# Patient Record
Sex: MALE | NOT HISPANIC OR LATINO | URBAN - METROPOLITAN AREA
[De-identification: names, ages, dates, MRNs, and addresses within clinical notes are randomized per-mention and may not be internally consistent; named-entity substitution may affect disease eponyms.]

---

## 2019-07-10 ENCOUNTER — INPATIENT (INPATIENT)
Facility: HOSPITAL | Age: 54
LOS: 1 days | Discharge: HOME | End: 2019-07-12
Attending: HOSPITALIST | Admitting: HOSPITALIST
Payer: MEDICAID

## 2019-07-10 VITALS
TEMPERATURE: 99 F | SYSTOLIC BLOOD PRESSURE: 114 MMHG | RESPIRATION RATE: 16 BRPM | DIASTOLIC BLOOD PRESSURE: 74 MMHG | HEART RATE: 89 BPM | OXYGEN SATURATION: 100 %

## 2019-07-10 DIAGNOSIS — R65.10 SYSTEMIC INFLAMMATORY RESPONSE SYNDROME (SIRS) OF NON-INFECTIOUS ORIGIN WITHOUT ACUTE ORGAN DYSFUNCTION: ICD-10-CM

## 2019-07-10 LAB
ALBUMIN SERPL ELPH-MCNC: 4.2 G/DL — SIGNIFICANT CHANGE UP (ref 3.3–5)
ALP SERPL-CCNC: 76 U/L — SIGNIFICANT CHANGE UP (ref 40–120)
ALT FLD-CCNC: 27 U/L — SIGNIFICANT CHANGE UP (ref 4–41)
ANION GAP SERPL CALC-SCNC: 9 MMO/L — SIGNIFICANT CHANGE UP (ref 7–14)
APPEARANCE UR: SIGNIFICANT CHANGE UP
APTT BLD: 27.3 SEC — LOW (ref 27.5–36.3)
AST SERPL-CCNC: 31 U/L — SIGNIFICANT CHANGE UP (ref 4–40)
BACTERIA # UR AUTO: SIGNIFICANT CHANGE UP
BASE EXCESS BLDV CALC-SCNC: 0.8 MMOL/L — SIGNIFICANT CHANGE UP
BASOPHILS # BLD AUTO: 0.06 K/UL — SIGNIFICANT CHANGE UP (ref 0–0.2)
BASOPHILS NFR BLD AUTO: 0.4 % — SIGNIFICANT CHANGE UP (ref 0–2)
BILIRUB SERPL-MCNC: 1.9 MG/DL — HIGH (ref 0.2–1.2)
BILIRUB UR-MCNC: NEGATIVE — SIGNIFICANT CHANGE UP
BLOOD GAS VENOUS - CREATININE: 0.88 MG/DL — SIGNIFICANT CHANGE UP (ref 0.5–1.3)
BLOOD GAS VENOUS - FIO2: 100 — SIGNIFICANT CHANGE UP
BLOOD UR QL VISUAL: SIGNIFICANT CHANGE UP
BUN SERPL-MCNC: 14 MG/DL — SIGNIFICANT CHANGE UP (ref 7–23)
CALCIUM SERPL-MCNC: 9.2 MG/DL — SIGNIFICANT CHANGE UP (ref 8.4–10.5)
CHLORIDE BLDV-SCNC: 110 MMOL/L — HIGH (ref 96–108)
CHLORIDE SERPL-SCNC: 104 MMOL/L — SIGNIFICANT CHANGE UP (ref 98–107)
CO2 SERPL-SCNC: 24 MMOL/L — SIGNIFICANT CHANGE UP (ref 22–31)
COLOR SPEC: YELLOW — SIGNIFICANT CHANGE UP
CREAT SERPL-MCNC: 1.07 MG/DL — SIGNIFICANT CHANGE UP (ref 0.5–1.3)
EOSINOPHIL # BLD AUTO: 0.07 K/UL — SIGNIFICANT CHANGE UP (ref 0–0.5)
EOSINOPHIL NFR BLD AUTO: 0.5 % — SIGNIFICANT CHANGE UP (ref 0–6)
GAS PNL BLDV: 133 MMOL/L — LOW (ref 136–146)
GLUCOSE BLDV-MCNC: 136 MG/DL — HIGH (ref 70–99)
GLUCOSE SERPL-MCNC: 161 MG/DL — HIGH (ref 70–99)
GLUCOSE UR-MCNC: NEGATIVE — SIGNIFICANT CHANGE UP
HCO3 BLDV-SCNC: 24 MMOL/L — SIGNIFICANT CHANGE UP (ref 20–27)
HCT VFR BLD CALC: 43.9 % — SIGNIFICANT CHANGE UP (ref 39–50)
HCT VFR BLDV CALC: 42.8 % — SIGNIFICANT CHANGE UP (ref 39–51)
HGB BLD-MCNC: 14.3 G/DL — SIGNIFICANT CHANGE UP (ref 13–17)
HGB BLDV-MCNC: 13.9 G/DL — SIGNIFICANT CHANGE UP (ref 13–17)
HYALINE CASTS # UR AUTO: NEGATIVE — SIGNIFICANT CHANGE UP
IMM GRANULOCYTES NFR BLD AUTO: 0.6 % — SIGNIFICANT CHANGE UP (ref 0–1.5)
INR BLD: 1.2 — HIGH (ref 0.88–1.17)
KETONES UR-MCNC: NEGATIVE — SIGNIFICANT CHANGE UP
LACTATE BLDV-MCNC: 1.8 MMOL/L — SIGNIFICANT CHANGE UP (ref 0.5–2)
LEUKOCYTE ESTERASE UR-ACNC: NEGATIVE — SIGNIFICANT CHANGE UP
LYMPHOCYTES # BLD AUTO: 0.85 K/UL — LOW (ref 1–3.3)
LYMPHOCYTES # BLD AUTO: 5.6 % — LOW (ref 13–44)
MCHC RBC-ENTMCNC: 26.8 PG — LOW (ref 27–34)
MCHC RBC-ENTMCNC: 32.6 % — SIGNIFICANT CHANGE UP (ref 32–36)
MCV RBC AUTO: 82.4 FL — SIGNIFICANT CHANGE UP (ref 80–100)
MONOCYTES # BLD AUTO: 0.87 K/UL — SIGNIFICANT CHANGE UP (ref 0–0.9)
MONOCYTES NFR BLD AUTO: 5.7 % — SIGNIFICANT CHANGE UP (ref 2–14)
NEUTROPHILS # BLD AUTO: 13.22 K/UL — HIGH (ref 1.8–7.4)
NEUTROPHILS NFR BLD AUTO: 87.2 % — HIGH (ref 43–77)
NITRITE UR-MCNC: NEGATIVE — SIGNIFICANT CHANGE UP
NRBC # FLD: 0 K/UL — SIGNIFICANT CHANGE UP (ref 0–0)
PCO2 BLDV: 39 MMHG — LOW (ref 41–51)
PH BLDV: 7.42 PH — SIGNIFICANT CHANGE UP (ref 7.32–7.43)
PH UR: 6.5 — SIGNIFICANT CHANGE UP (ref 5–8)
PLATELET # BLD AUTO: 226 K/UL — SIGNIFICANT CHANGE UP (ref 150–400)
PMV BLD: 9.4 FL — SIGNIFICANT CHANGE UP (ref 7–13)
PO2 BLDV: 22 MMHG — LOW (ref 35–40)
POTASSIUM BLDV-SCNC: 3.8 MMOL/L — SIGNIFICANT CHANGE UP (ref 3.4–4.5)
POTASSIUM SERPL-MCNC: 4.5 MMOL/L — SIGNIFICANT CHANGE UP (ref 3.5–5.3)
POTASSIUM SERPL-SCNC: 4.5 MMOL/L — SIGNIFICANT CHANGE UP (ref 3.5–5.3)
PROT SERPL-MCNC: 8.2 G/DL — SIGNIFICANT CHANGE UP (ref 6–8.3)
PROT UR-MCNC: 30 — SIGNIFICANT CHANGE UP
PROTHROM AB SERPL-ACNC: 13.7 SEC — HIGH (ref 9.8–13.1)
RBC # BLD: 5.33 M/UL — SIGNIFICANT CHANGE UP (ref 4.2–5.8)
RBC # FLD: 12.9 % — SIGNIFICANT CHANGE UP (ref 10.3–14.5)
RBC CASTS # UR COMP ASSIST: SIGNIFICANT CHANGE UP (ref 0–?)
SAO2 % BLDV: 35.1 % — LOW (ref 60–85)
SODIUM SERPL-SCNC: 137 MMOL/L — SIGNIFICANT CHANGE UP (ref 135–145)
SP GR SPEC: 1.02 — SIGNIFICANT CHANGE UP (ref 1–1.04)
SQUAMOUS # UR AUTO: SIGNIFICANT CHANGE UP
UROBILINOGEN FLD QL: SIGNIFICANT CHANGE UP
WBC # BLD: 15.16 K/UL — HIGH (ref 3.8–10.5)
WBC # FLD AUTO: 15.16 K/UL — HIGH (ref 3.8–10.5)
WBC UR QL: SIGNIFICANT CHANGE UP (ref 0–?)

## 2019-07-10 PROCEDURE — 71046 X-RAY EXAM CHEST 2 VIEWS: CPT | Mod: 26

## 2019-07-10 PROCEDURE — 93306 TTE W/DOPPLER COMPLETE: CPT | Mod: 26

## 2019-07-10 RX ORDER — ATORVASTATIN CALCIUM 80 MG/1
80 TABLET, FILM COATED ORAL AT BEDTIME
Refills: 0 | Status: DISCONTINUED | OUTPATIENT
Start: 2019-07-11 | End: 2019-07-11

## 2019-07-10 RX ORDER — ATORVASTATIN CALCIUM 80 MG/1
80 TABLET, FILM COATED ORAL ONCE
Refills: 0 | Status: COMPLETED | OUTPATIENT
Start: 2019-07-10 | End: 2019-07-10

## 2019-07-10 RX ORDER — METOCLOPRAMIDE HCL 10 MG
10 TABLET ORAL ONCE
Refills: 0 | Status: COMPLETED | OUTPATIENT
Start: 2019-07-10 | End: 2019-07-10

## 2019-07-10 RX ORDER — SODIUM CHLORIDE 9 MG/ML
1000 INJECTION INTRAMUSCULAR; INTRAVENOUS; SUBCUTANEOUS ONCE
Refills: 0 | Status: COMPLETED | OUTPATIENT
Start: 2019-07-10 | End: 2019-07-10

## 2019-07-10 RX ORDER — LISINOPRIL 2.5 MG/1
5 TABLET ORAL ONCE
Refills: 0 | Status: COMPLETED | OUTPATIENT
Start: 2019-07-10 | End: 2019-07-10

## 2019-07-10 RX ORDER — CLOPIDOGREL BISULFATE 75 MG/1
75 TABLET, FILM COATED ORAL ONCE
Refills: 0 | Status: COMPLETED | OUTPATIENT
Start: 2019-07-10 | End: 2019-07-10

## 2019-07-10 RX ORDER — ASPIRIN/CALCIUM CARB/MAGNESIUM 324 MG
81 TABLET ORAL DAILY
Refills: 0 | Status: DISCONTINUED | OUTPATIENT
Start: 2019-07-10 | End: 2019-07-12

## 2019-07-10 RX ORDER — PIPERACILLIN AND TAZOBACTAM 4; .5 G/20ML; G/20ML
3.38 INJECTION, POWDER, LYOPHILIZED, FOR SOLUTION INTRAVENOUS ONCE
Refills: 0 | Status: COMPLETED | OUTPATIENT
Start: 2019-07-10 | End: 2019-07-10

## 2019-07-10 RX ORDER — ACETAMINOPHEN 500 MG
975 TABLET ORAL ONCE
Refills: 0 | Status: COMPLETED | OUTPATIENT
Start: 2019-07-10 | End: 2019-07-10

## 2019-07-10 RX ORDER — DIAZEPAM 5 MG
5 TABLET ORAL ONCE
Refills: 0 | Status: DISCONTINUED | OUTPATIENT
Start: 2019-07-10 | End: 2019-07-10

## 2019-07-10 RX ADMIN — Medication 5 MILLIGRAM(S): at 10:18

## 2019-07-10 RX ADMIN — LISINOPRIL 5 MILLIGRAM(S): 2.5 TABLET ORAL at 10:18

## 2019-07-10 RX ADMIN — Medication 10 MILLIGRAM(S): at 10:07

## 2019-07-10 RX ADMIN — SODIUM CHLORIDE 1000 MILLILITER(S): 9 INJECTION INTRAMUSCULAR; INTRAVENOUS; SUBCUTANEOUS at 23:00

## 2019-07-10 RX ADMIN — Medication 975 MILLIGRAM(S): at 21:18

## 2019-07-10 RX ADMIN — SODIUM CHLORIDE 1000 MILLILITER(S): 9 INJECTION INTRAMUSCULAR; INTRAVENOUS; SUBCUTANEOUS at 21:30

## 2019-07-10 RX ADMIN — SODIUM CHLORIDE 1000 MILLILITER(S): 9 INJECTION INTRAMUSCULAR; INTRAVENOUS; SUBCUTANEOUS at 13:22

## 2019-07-10 RX ADMIN — Medication 81 MILLIGRAM(S): at 10:08

## 2019-07-10 RX ADMIN — Medication 975 MILLIGRAM(S): at 17:37

## 2019-07-10 RX ADMIN — CLOPIDOGREL BISULFATE 75 MILLIGRAM(S): 75 TABLET, FILM COATED ORAL at 10:08

## 2019-07-10 RX ADMIN — PIPERACILLIN AND TAZOBACTAM 200 GRAM(S): 4; .5 INJECTION, POWDER, LYOPHILIZED, FOR SOLUTION INTRAVENOUS at 21:30

## 2019-07-10 RX ADMIN — ATORVASTATIN CALCIUM 80 MILLIGRAM(S): 80 TABLET, FILM COATED ORAL at 10:17

## 2019-07-10 RX ADMIN — SODIUM CHLORIDE 1000 MILLILITER(S): 9 INJECTION INTRAMUSCULAR; INTRAVENOUS; SUBCUTANEOUS at 10:08

## 2019-07-10 NOTE — ED ADULT TRIAGE NOTE - CHIEF COMPLAINT QUOTE
pt comes to ED for dizziness and a fever. pt was d/pedro from [facility] in [place] where he had 4 cardiac stents placed. pt states since he got discharged yesterday he has been dizzy and weak. pt denies CP or SOB. pt is on ASA and plavix. pt comes to ED for dizziness and a fever. pt was d/pedro from [facility] in [place] where he had 4 cardiac stents placed and as per paperwork he has a stemi  pt states since he got discharged yesterday he has been dizzy and weak. pt denies CP or SOB. pt is on ASA and plavix.

## 2019-07-10 NOTE — CONSULT NOTE ADULT - SUBJECTIVE AND OBJECTIVE BOX
ERIN [id number] is a 54y old  Male who presents with a chief complaint of     HPI: 55 yo M with PMH of CAD s/p stenting, HTN, HLD presents with           MEDICATIONS  (STANDING):  aspirin  chewable 81 milliGRAM(s) Oral daily    MEDICATIONS  (PRN):    PAST MEDICAL & SURGICAL HISTORY:  MI (myocardial infarction): with 4 stents placed  HTN (hypertension)  No significant past surgical history    FAMILY HISTORY:  Family history of MI (myocardial infarction) (Father)  Family history of premature CAD (Father, Mother)    Allergies    Benadryl (Unknown)    Intolerances        SHx - No smoking, No ETOH, No drug abuse      Review of Systems:  CONSTITUTIONAL:   HEENT:  No visual loss, blurred vision, double vision.  No hearing loss, sneezing, congestion, runny nose or sore throat.  SKIN:  No rash or itching.  CARDIOVASCULAR:  No chest pain, chest pressure or chest discomfort. No palpitations or edema.  RESPIRATORY:  No shortness of breath, cough or sputum.  GASTROINTESTINAL:  No anorexia, nausea, vomiting or diarrhea. No abdominal pain.  GENITOURINARY:  NO dysuria. No increased frequency. No retention. No incontinence.  NEUROLOGICAL:  See HPI  MUSCULOSKELETAL:  No muscle, back pain, joint pain or stiffness.  HEMATOLOGIC:  No anemia, bleeding or bruising.  PSYCHIATRIC:    ENDOCRINOLOGIC:  No reports of sweating, cold or heat intolerance. No polyuria or polydipsia.        Vital Signs Last 24 Hrs  T(C): 37.1 (10 Jul 2019 08:45), Max: 37.1 (10 Jul 2019 08:45)  T(F): 98.8 (10 Jul 2019 08:45), Max: 98.8 (10 Jul 2019 08:45)  HR: 89 (10 Jul 2019 08:45) (89 - 89)  BP: 114/74 (10 Jul 2019 08:45) (114/74 - 114/74)  BP(mean): --  RR: 16 (10 Jul 2019 08:45) (16 - 16)  SpO2: 100% (10 Jul 2019 08:45) (100% - 100%)    General Exam:   General appearance: No acute distress    Cardiac:  Pulm:                 Neurological Exam:  Mental Status: Orientated to self, date and place.  Attention intact.  No dysarthria. Speech fluent.  Cranial Nerves:   PERRL, EOMI, VFF, no nystagmus.    CN V1-3 intact to light touch .  No facial asymmetry.  Hearing intact to finger rub bilaterally.  Tongue, uvula and palate midline.  Sternocleidomastoid and Trapezius intact bilaterally.    Motor:   Tone: normal.                  Strength:     [] Upper extremity                      Delt       Bicep    Tricep                                                  R         5/5 5/5 5/5 5/5                                               L          5/5 5/5 5/5       5/5  [] Lower extremity                       HF          KE          KF        DF         PF                                               R        5/5 5/5 5/5 5/5 5/5                                               L         5/5 5/5 5/5 5/5        5/5  Pronator drift: none                 Dysmetria: None to finger-nose-finger or heel-shin-heel  No truncal ataxia.    Tremor: No resting, postural or action tremor.  No myoclonus.    Sensation: intact to light touch, pinprick, vibration and proprioception    Deep Tendon Reflexes:     Biceps          Triceps      BR        Patellar        Ankle         Babinski                                         R       2+                   2+           2+            2+               2+           downgoing                                         L        2+                  2+           2+            2+               2+           downgoing    Gait: normal.      Other:    07-10    137  |  104  |  14  ----------------------------<  161<H>  4.5   |  24  |  1.07    Ca    9.2      10 Jul 2019 10:04    TPro  8.2  /  Alb  4.2  /  TBili  1.9<H>  /  DBili  x   /  AST  31  /  ALT  27  /  AlkPhos  76  07-10                            14.3   15.16 )-----------( 226      ( 10 Jul 2019 10:04 )             43.9       Radiology    CT:   MRI  EKG:  tele:  TTE:  EEG: ERIN [id number] is a 54y old  Male who presents with a chief complaint of     HPI: 53 yo M with PMH of CAD s/p stenting, HTN, HLD presents with an episode of vertigo. He reports that he when sitting down to go to the bathroom, he developed an episode of room spinning sensation. He also endorsed feeling diaphoretic. Episode of vertigo lasted 30 minutes. Endorses nausea, no vomiting. Also felt off balance.     MEDICATIONS  (STANDING):  aspirin  chewable 81 milliGRAM(s) Oral daily    MEDICATIONS  (PRN):    PAST MEDICAL & SURGICAL HISTORY:  MI (myocardial infarction): with 4 stents placed  HTN (hypertension)  No significant past surgical history    FAMILY HISTORY:  Family history of MI (myocardial infarction) (Father)  Family history of premature CAD (Father, Mother)    Allergies    Benadryl (Unknown)    Intolerances        SHx - No smoking, No ETOH, No drug abuse      Review of Systems:    see hpi      Vital Signs Last 24 Hrs  T(C): 37.1 (10 Jul 2019 08:45), Max: 37.1 (10 Jul 2019 08:45)  T(F): 98.8 (10 Jul 2019 08:45), Max: 98.8 (10 Jul 2019 08:45)  HR: 89 (10 Jul 2019 08:45) (89 - 89)  BP: 114/74 (10 Jul 2019 08:45) (114/74 - 114/74)  BP(mean): --  RR: 16 (10 Jul 2019 08:45) (16 - 16)  SpO2: 100% (10 Jul 2019 08:45) (100% - 100%)    Neurological Exam:    Mental Status: Orientated to self, date and place.  Attention intact.  No dysarthria. Speech fluent. follows all commands  Cranial Nerves: EOMI, VFF, no nystagmus.  No facial asymmetry. Tongue midline.        Strength:     [] Upper extremity                      Delt       Bicep    Tricep                                                  R         5/5        5/5        5/5       5/5                                               L          5/5        5/5        5/5       5/5    [] Lower extremity                       HF          KE          KF        DF         PF                                               R        5/5        5/5        5/5       5/5       5/5                                               L         5/5        5/5       5/5       5/5        5/5  Pronator drift: none                 Dysmetria: None to finger-nose-finger or heel-shin-heel    Sensation: intact to light touch    Gait: normal.     Other:    07-10    137  |  104  |  14  ----------------------------<  161<H>  4.5   |  24  |  1.07    Ca    9.2      10 Jul 2019 10:04    TPro  8.2  /  Alb  4.2  /  TBili  1.9<H>  /  DBili  x   /  AST  31  /  ALT  27  /  AlkPhos  76  07-10                            14.3   15.16 )-----------( 226      ( 10 Jul 2019 10:04 )             43.9       Radiology    CT:   MRI  EKG:  tele:  TTE:  EEG: ERIN [id number] is a 54y old  Male who presents with a chief complaint of     HPI: 55 yo  M with PMH of CAD s/p stenting, HTN, HLD presents with an episode of vertigo. He reports that he when sitting down to go to the bathroom at 3-4 am on 7/10, he developed an episode of room spinning sensation. Symptoms presented while he was sitting down, before he got to standing position. He also endorsed feeling diaphoretic and lightheaded along with vertiginous sensation. Episode of vertigo lasted between 30 minutes and 1 hour. Endorses nausea, no vomiting. Also felt off balance. No dysarthria, diplopia, numbness, weakness.    This past Saturday, patient was complaining of chest pain, had went to the hospital and was diagnosed with STEMI and underwent coronary artery stenting. He was recently placed on aspirin, plavix, and atorvastatin 80.    NIHSS 0. MRS 0.    MEDICATIONS  (STANDING):  aspirin  chewable 81 milliGRAM(s) Oral daily    MEDICATIONS  (PRN):    PAST MEDICAL & SURGICAL HISTORY:  MI (myocardial infarction): with 4 stents placed  HTN (hypertension)  No significant past surgical history    FAMILY HISTORY:  Family history of MI (myocardial infarction) (Father)  Family history of premature CAD (Father, Mother)    Allergies    Benadryl (Unknown)    Intolerances        SHx - No smoking, No ETOH, No drug abuse      Review of Systems:    see hpi      Vital Signs Last 24 Hrs  T(C): 37.1 (10 Jul 2019 08:45), Max: 37.1 (10 Jul 2019 08:45)  T(F): 98.8 (10 Jul 2019 08:45), Max: 98.8 (10 Jul 2019 08:45)  HR: 89 (10 Jul 2019 08:45) (89 - 89)  BP: 114/74 (10 Jul 2019 08:45) (114/74 - 114/74)  BP(mean): --  RR: 16 (10 Jul 2019 08:45) (16 - 16)  SpO2: 100% (10 Jul 2019 08:45) (100% - 100%)    Neurological Exam:    Mental Status: Orientated to self, date and place.  Attention intact.  No dysarthria. Speech fluent. follows all commands  Cranial Nerves: EOMI, VFF, no nystagmus.  No facial asymmetry. Tongue midline.        Strength:     [] Upper extremity                      Delt       Bicep    Tricep                                                  R         5/5 5/5        5/5       5/5                                               L          5/5        5/5        5/5       5/5    [] Lower extremity                       HF          KE          KF        DF         PF                                               R        5/5 5/5 5/5 5/5       5/5                                               L         5/5 5/5 5/5 5/5 5/5  Pronator drift: none                 Dysmetria: None to finger-nose-finger or heel-shin-heel    Sensation: intact to light touch    Gait: normal.     Other:    07-10    137  |  104  |  14  ----------------------------<  161<H>  4.5   |  24  |  1.07    Ca    9.2      10 Jul 2019 10:04    TPro  8.2  /  Alb  4.2  /  TBili  1.9<H>  /  DBili  x   /  AST  31  /  ALT  27  /  AlkPhos  76  07-10                            14.3   15.16 )-----------( 226      ( 10 Jul 2019 10:04 )             43.9       Radiology    CT:   MRI  EKG:  tele:  TTE:  EEG:

## 2019-07-10 NOTE — ED ADULT NURSE REASSESSMENT NOTE - NS ED NURSE REASSESS COMMENT FT1
Patient rc'd from day RN: Patient awake, alert, denies pain or any complaints at this time. Antibiotics& fluids initiated, will continue to monitor

## 2019-07-10 NOTE — ED PROVIDER NOTE - OBJECTIVE STATEMENT
09:06 [place] att: 54M days s/p 4 cardiac stents p/w fever and dizziness x   Yesterday patient was discharged from [facility] where he had 4 cardiac stents.     pt comes to ED for dizziness and a fever. pt was d/pedro from [facility] in [place] where he had 4 cardiac stents placed and as per paperwork he has a stemi  pt states since he got discharged yesterday he has been dizzy and weak. pt denies CP or SOB. pt is on ASA and plavix. 09:06 [place] att: 54M days s/p 4 cardiac stents p/w fever and dizziness x few hours     Developed chest pain on Saturday while at work.  pt was taken to [facility] where he had 2 stents placed on saturday and 2 stents placed on monday.  pt was discharged on Tuesday late afternoon.  Around midnight developed subjective fevers, at 3am while attempting to have bowel movement developed dizziness headache and sensation of room spinning with associated nausea.  denies sob, cp, back/arm/jaw pain, vomiting, diarrhea, cough, change in vision, blurry vision, numbness and or tingling  Dr Arce [facility]  09:06 [place] att: 54M days s/p 4 cardiac stents p/w fever and dizziness x few hours. Four days ago at work patient experienced severe chest pain. Taken to [facility] in [place], dx stemi, had 2 stents placed. Hospital day #2 patient was given additional 2 stents. Yesterday patient discharged. This morning at midnight patient felt warm on and off and took Tylenol. At 3-4A patient went to bathroom and felt sudden onset room spinning and nausea. Cannot stand 2/2 fear he will fall. Denies visual disturbance, aphasia, dysphagia, or focal motor or sensory disturbance of face arm or leg.     Developed chest pain on Saturday while at work.  pt was taken to [facility] where he had 2 stents placed on saturday and 2 stents placed on monday.  pt was discharged on Tuesday late afternoon.  Around midnight developed subjective fevers, at 3am while attempting to have bowel movement developed dizziness headache and sensation of room spinning with associated nausea.  denies sob, cp, back/arm/jaw pain, vomiting, diarrhea, cough, change in vision, blurry vision, numbness and or tingling  Dr Arce [facility]

## 2019-07-10 NOTE — ED PROVIDER NOTE - PHYSICAL EXAMINATION
[place] att: nad, aaox3, ctabl, rrr, s1s2, abd soft ntnd, neg le edema. CN 2-12 intact, neg pronator drift, 5/5 str throughout, sensation intact throughout, gait steady, clear speech. Pt refusing to look left. Nl finger nose. Nl rapid alt movement. Nl heel shin. Nl gait. Neg romberg.

## 2019-07-10 NOTE — ED ADULT NURSE NOTE - CHIEF COMPLAINT QUOTE
pt comes to ED for dizziness and a fever. pt was d/pedro from [facility] in [place] where he had 4 cardiac stents placed and as per paperwork he has a stemi  pt states since he got discharged yesterday he has been dizzy and weak. pt denies CP or SOB. pt is on ASA and plavix.

## 2019-07-10 NOTE — ED PROVIDER NOTE - PROGRESS NOTE DETAILS
pa taylor - symptoms improved with medication. layo vazquez- labs reviewed wbc 15 will add on ua and uc [place] PGY-2:  Signout from [place]: This AM had episode of vertigo, to get cxr/UA/echo and reassess, ok to go home if negative priscilla: asked top assess for possible cdu transfer. Had 2 coronary stents placed 5 and 3 days ago. last nite felt feverish and had 2 episodes of vertigo. Came to ED today.   exam: mild unsteadiness heel to toe. pt being evaluated by neurology. Oral temp is 101 and wbc 15k. Pt not a candidate for CDU and EM team will admit to medicine for further care.

## 2019-07-10 NOTE — ED PROVIDER NOTE - ATTENDING CONTRIBUTION TO CARE
Dr. Bartholomew: I performed a face to face bedside interview with patient regarding history of present illness, review of symptoms and past medical history. I completed an independent physical exam.  I have discussed patient's plan of care with PA.   I agree with note as stated above, having amended the EMR as needed to reflect my findings.   This includes HISTORY OF PRESENT ILLNESS, HIV, PAST MEDICAL/SURGICAL/FAMILY/SOCIAL HISTORY, ALLERGIES AND HOME MEDICATIONS, REVIEW OF SYSTEMS, PHYSICAL EXAM, and any PROGRESS NOTES during the time I functioned as the attending physician for this patient. HPI above as by me. PE above as by me. DDX low ef versus peripheral vertigo PLAN infection workup, meclizine or valium, arrange echo.

## 2019-07-10 NOTE — ED PROVIDER NOTE - CARE PLAN
Principal Discharge DX:	Dizziness Principal Discharge DX:	SIRS (systemic inflammatory response syndrome)

## 2019-07-10 NOTE — ED ADULT NURSE NOTE - OBJECTIVE STATEMENT
Pt s/p  cardiac stents x 4  on Friday c/o dizziness, headache and nausea.  Denies chest pain, sob.  Resp unlabored.  Skin intact.  IV placed. Labs sent Meds given as per order.  Pt NSR on cardiac monitoring

## 2019-07-10 NOTE — CONSULT NOTE ADULT - ASSESSMENT
Impression: Episode of vertigo could have TIA. He does have vascular risk factors. 55 yo [place] M with PMH of CAD s/p stenting, HTN, HLD presents with an episode of vertigo that lasted 30 min to 1 hour.    Impression: Episode of vertigo could have been posterior circulation TIA. BPPV is on differential but is less likely to have lasted 30 min to 1 hour. He does has multiple vascular risk factors.     Plan:  -MRI brain w/o contrast  -MRA head w/o contrast  -MRA neck w/ contrast  -c/w aspirin and plavix  -c/w atorvastatin 80  - Agree with pharmacological DVT prophylaxis   - HbA1C and LDL  - Permissive HTN up to 220/110 mmHg for first 48-72 hours from symptom onset followed by gradual normotension  - TTE with bubble study   - continue with telemetry to screen for possible cardiac source of embolism  - Prolonged cardiac monitoring with ICM to screen for occult cardiac arrhythmia like atrial fibrillation being the cause of possible cardiac source of embolism to be considered based on results of above mentioned cardiac tests 55 yo [place] M with PMH of CAD s/p stenting, HTN, HLD presents with an episode of vertigo that lasted 30 min to 1 hour. Neurological examination is normal.    Impression: Episode of vertigo could have been posterior circulation TIA. BPPV is on differential but is less likely to have lasted 30 min to 1 hour. He does has multiple vascular risk factors.     Plan:  -MRI brain w/o contrast  -MRA head w/o contrast  -MRA neck w/ contrast  -c/w aspirin and plavix  -c/w atorvastatin 80  - Agree with pharmacological DVT prophylaxis   - HbA1C and LDL  - Permissive HTN up to 220/110 mmHg for first 48-72 hours from symptom onset followed by gradual normotension  - TTE with bubble study   - continue with telemetry to screen for possible cardiac source of embolism  - Prolonged cardiac monitoring with ICM to screen for occult cardiac arrhythmia like atrial fibrillation being the cause of possible cardiac source of embolism to be considered based on results of above mentioned cardiac tests

## 2019-07-10 NOTE — ED PROVIDER NOTE - CLINICAL SUMMARY MEDICAL DECISION MAKING FREE TEXT BOX
54 year old male with 4 recents stents placed presenting with headache, weakness, nausea and dizziness r/o infection, vertigo  -labs  -valium for vertigo - pt has rash allergy to benadryl  -reglan for nausea and headache  -fluid for hydration  -aspirin and plavix - pt home meds did not take today  -echo r/o cardiac source

## 2019-07-11 DIAGNOSIS — Z95.5 PRESENCE OF CORONARY ANGIOPLASTY IMPLANT AND GRAFT: Chronic | ICD-10-CM

## 2019-07-11 DIAGNOSIS — I50.20 UNSPECIFIED SYSTOLIC (CONGESTIVE) HEART FAILURE: ICD-10-CM

## 2019-07-11 DIAGNOSIS — I50.41 ACUTE COMBINED SYSTOLIC (CONGESTIVE) AND DIASTOLIC (CONGESTIVE) HEART FAILURE: ICD-10-CM

## 2019-07-11 DIAGNOSIS — R42 DIZZINESS AND GIDDINESS: ICD-10-CM

## 2019-07-11 DIAGNOSIS — I50.30 UNSPECIFIED DIASTOLIC (CONGESTIVE) HEART FAILURE: ICD-10-CM

## 2019-07-11 DIAGNOSIS — Z29.9 ENCOUNTER FOR PROPHYLACTIC MEASURES, UNSPECIFIED: ICD-10-CM

## 2019-07-11 DIAGNOSIS — I25.10 ATHEROSCLEROTIC HEART DISEASE OF NATIVE CORONARY ARTERY WITHOUT ANGINA PECTORIS: ICD-10-CM

## 2019-07-11 DIAGNOSIS — E80.6 OTHER DISORDERS OF BILIRUBIN METABOLISM: ICD-10-CM

## 2019-07-11 DIAGNOSIS — R65.10 SYSTEMIC INFLAMMATORY RESPONSE SYNDROME (SIRS) OF NON-INFECTIOUS ORIGIN WITHOUT ACUTE ORGAN DYSFUNCTION: ICD-10-CM

## 2019-07-11 DIAGNOSIS — R19.7 DIARRHEA, UNSPECIFIED: ICD-10-CM

## 2019-07-11 LAB
ALBUMIN SERPL ELPH-MCNC: 3.7 G/DL — SIGNIFICANT CHANGE UP (ref 3.3–5)
ALP SERPL-CCNC: 66 U/L — SIGNIFICANT CHANGE UP (ref 40–120)
ALT FLD-CCNC: 27 U/L — SIGNIFICANT CHANGE UP (ref 4–41)
ANION GAP SERPL CALC-SCNC: 11 MMO/L — SIGNIFICANT CHANGE UP (ref 7–14)
AST SERPL-CCNC: 30 U/L — SIGNIFICANT CHANGE UP (ref 4–40)
B PERT DNA SPEC QL NAA+PROBE: NOT DETECTED — SIGNIFICANT CHANGE UP
BASOPHILS # BLD AUTO: 0.06 K/UL — SIGNIFICANT CHANGE UP (ref 0–0.2)
BASOPHILS NFR BLD AUTO: 0.6 % — SIGNIFICANT CHANGE UP (ref 0–2)
BILIRUB DIRECT SERPL-MCNC: 0.3 MG/DL — HIGH (ref 0.1–0.2)
BILIRUB SERPL-MCNC: 1.4 MG/DL — HIGH (ref 0.2–1.2)
BILIRUB SERPL-MCNC: 1.4 MG/DL — HIGH (ref 0.2–1.2)
BUN SERPL-MCNC: 8 MG/DL — SIGNIFICANT CHANGE UP (ref 7–23)
C PNEUM DNA SPEC QL NAA+PROBE: NOT DETECTED — SIGNIFICANT CHANGE UP
CALCIUM SERPL-MCNC: 8.4 MG/DL — SIGNIFICANT CHANGE UP (ref 8.4–10.5)
CHLORIDE SERPL-SCNC: 107 MMOL/L — SIGNIFICANT CHANGE UP (ref 98–107)
CO2 SERPL-SCNC: 19 MMOL/L — LOW (ref 22–31)
CREAT SERPL-MCNC: 0.79 MG/DL — SIGNIFICANT CHANGE UP (ref 0.5–1.3)
EOSINOPHIL # BLD AUTO: 0.01 K/UL — SIGNIFICANT CHANGE UP (ref 0–0.5)
EOSINOPHIL NFR BLD AUTO: 0.1 % — SIGNIFICANT CHANGE UP (ref 0–6)
FLUAV H1 2009 PAND RNA SPEC QL NAA+PROBE: NOT DETECTED — SIGNIFICANT CHANGE UP
FLUAV H1 RNA SPEC QL NAA+PROBE: NOT DETECTED — SIGNIFICANT CHANGE UP
FLUAV H3 RNA SPEC QL NAA+PROBE: NOT DETECTED — SIGNIFICANT CHANGE UP
FLUAV SUBTYP SPEC NAA+PROBE: NOT DETECTED — SIGNIFICANT CHANGE UP
FLUBV RNA SPEC QL NAA+PROBE: NOT DETECTED — SIGNIFICANT CHANGE UP
GLUCOSE SERPL-MCNC: 138 MG/DL — HIGH (ref 70–99)
HADV DNA SPEC QL NAA+PROBE: NOT DETECTED — SIGNIFICANT CHANGE UP
HBA1C BLD-MCNC: 6.5 % — HIGH (ref 4–5.6)
HCOV PNL SPEC NAA+PROBE: SIGNIFICANT CHANGE UP
HCT VFR BLD CALC: 40.7 % — SIGNIFICANT CHANGE UP (ref 39–50)
HGB BLD-MCNC: 13.2 G/DL — SIGNIFICANT CHANGE UP (ref 13–17)
HMPV RNA SPEC QL NAA+PROBE: NOT DETECTED — SIGNIFICANT CHANGE UP
HPIV1 RNA SPEC QL NAA+PROBE: NOT DETECTED — SIGNIFICANT CHANGE UP
HPIV2 RNA SPEC QL NAA+PROBE: NOT DETECTED — SIGNIFICANT CHANGE UP
HPIV3 RNA SPEC QL NAA+PROBE: NOT DETECTED — SIGNIFICANT CHANGE UP
HPIV4 RNA SPEC QL NAA+PROBE: NOT DETECTED — SIGNIFICANT CHANGE UP
IMM GRANULOCYTES NFR BLD AUTO: 0.4 % — SIGNIFICANT CHANGE UP (ref 0–1.5)
LYMPHOCYTES # BLD AUTO: 1.61 K/UL — SIGNIFICANT CHANGE UP (ref 1–3.3)
LYMPHOCYTES # BLD AUTO: 15.7 % — SIGNIFICANT CHANGE UP (ref 13–44)
MAGNESIUM SERPL-MCNC: 1.8 MG/DL — SIGNIFICANT CHANGE UP (ref 1.6–2.6)
MCHC RBC-ENTMCNC: 27.3 PG — SIGNIFICANT CHANGE UP (ref 27–34)
MCHC RBC-ENTMCNC: 32.4 % — SIGNIFICANT CHANGE UP (ref 32–36)
MCV RBC AUTO: 84.1 FL — SIGNIFICANT CHANGE UP (ref 80–100)
MONOCYTES # BLD AUTO: 0.85 K/UL — SIGNIFICANT CHANGE UP (ref 0–0.9)
MONOCYTES NFR BLD AUTO: 8.3 % — SIGNIFICANT CHANGE UP (ref 2–14)
NEUTROPHILS # BLD AUTO: 7.68 K/UL — HIGH (ref 1.8–7.4)
NEUTROPHILS NFR BLD AUTO: 74.9 % — SIGNIFICANT CHANGE UP (ref 43–77)
NRBC # FLD: 0 K/UL — SIGNIFICANT CHANGE UP (ref 0–0)
PHOSPHATE SERPL-MCNC: 2.3 MG/DL — LOW (ref 2.5–4.5)
PLATELET # BLD AUTO: 210 K/UL — SIGNIFICANT CHANGE UP (ref 150–400)
PMV BLD: 10.1 FL — SIGNIFICANT CHANGE UP (ref 7–13)
POTASSIUM SERPL-MCNC: 3.6 MMOL/L — SIGNIFICANT CHANGE UP (ref 3.5–5.3)
POTASSIUM SERPL-SCNC: 3.6 MMOL/L — SIGNIFICANT CHANGE UP (ref 3.5–5.3)
PROT SERPL-MCNC: 7.3 G/DL — SIGNIFICANT CHANGE UP (ref 6–8.3)
RBC # BLD: 4.84 M/UL — SIGNIFICANT CHANGE UP (ref 4.2–5.8)
RBC # FLD: 12.9 % — SIGNIFICANT CHANGE UP (ref 10.3–14.5)
RSV RNA SPEC QL NAA+PROBE: NOT DETECTED — SIGNIFICANT CHANGE UP
RV+EV RNA SPEC QL NAA+PROBE: NOT DETECTED — SIGNIFICANT CHANGE UP
SODIUM SERPL-SCNC: 137 MMOL/L — SIGNIFICANT CHANGE UP (ref 135–145)
SPECIMEN SOURCE: SIGNIFICANT CHANGE UP
SPECIMEN SOURCE: SIGNIFICANT CHANGE UP
TSH SERPL-MCNC: 1.34 UIU/ML — SIGNIFICANT CHANGE UP (ref 0.27–4.2)
WBC # BLD: 10.25 K/UL — SIGNIFICANT CHANGE UP (ref 3.8–10.5)
WBC # FLD AUTO: 10.25 K/UL — SIGNIFICANT CHANGE UP (ref 3.8–10.5)

## 2019-07-11 PROCEDURE — 99223 1ST HOSP IP/OBS HIGH 75: CPT | Mod: GC

## 2019-07-11 PROCEDURE — 70450 CT HEAD/BRAIN W/O DYE: CPT | Mod: 26

## 2019-07-11 PROCEDURE — 12345: CPT | Mod: NC

## 2019-07-11 RX ORDER — LISINOPRIL 2.5 MG/1
5 TABLET ORAL DAILY
Refills: 0 | Status: DISCONTINUED | OUTPATIENT
Start: 2019-07-11 | End: 2019-07-12

## 2019-07-11 RX ORDER — SODIUM CHLORIDE 9 MG/ML
1000 INJECTION INTRAMUSCULAR; INTRAVENOUS; SUBCUTANEOUS
Refills: 0 | Status: DISCONTINUED | OUTPATIENT
Start: 2019-07-11 | End: 2019-07-12

## 2019-07-11 RX ORDER — CLOPIDOGREL BISULFATE 75 MG/1
75 TABLET, FILM COATED ORAL DAILY
Refills: 0 | Status: DISCONTINUED | OUTPATIENT
Start: 2019-07-11 | End: 2019-07-12

## 2019-07-11 RX ORDER — METOPROLOL TARTRATE 50 MG
25 TABLET ORAL EVERY 12 HOURS
Refills: 0 | Status: DISCONTINUED | OUTPATIENT
Start: 2019-07-11 | End: 2019-07-12

## 2019-07-11 RX ORDER — PIPERACILLIN AND TAZOBACTAM 4; .5 G/20ML; G/20ML
3.38 INJECTION, POWDER, LYOPHILIZED, FOR SOLUTION INTRAVENOUS EVERY 8 HOURS
Refills: 0 | Status: DISCONTINUED | OUTPATIENT
Start: 2019-07-11 | End: 2019-07-12

## 2019-07-11 RX ORDER — ATORVASTATIN CALCIUM 80 MG/1
80 TABLET, FILM COATED ORAL AT BEDTIME
Refills: 0 | Status: DISCONTINUED | OUTPATIENT
Start: 2019-07-11 | End: 2019-07-12

## 2019-07-11 RX ORDER — ACETAMINOPHEN 500 MG
650 TABLET ORAL ONCE
Refills: 0 | Status: COMPLETED | OUTPATIENT
Start: 2019-07-11 | End: 2019-07-11

## 2019-07-11 RX ADMIN — PIPERACILLIN AND TAZOBACTAM 25 GRAM(S): 4; .5 INJECTION, POWDER, LYOPHILIZED, FOR SOLUTION INTRAVENOUS at 11:10

## 2019-07-11 RX ADMIN — Medication 650 MILLIGRAM(S): at 05:28

## 2019-07-11 RX ADMIN — Medication 81 MILLIGRAM(S): at 11:15

## 2019-07-11 RX ADMIN — CLOPIDOGREL BISULFATE 75 MILLIGRAM(S): 75 TABLET, FILM COATED ORAL at 11:15

## 2019-07-11 RX ADMIN — Medication 25 MILLIGRAM(S): at 17:44

## 2019-07-11 RX ADMIN — ATORVASTATIN CALCIUM 80 MILLIGRAM(S): 80 TABLET, FILM COATED ORAL at 00:23

## 2019-07-11 RX ADMIN — Medication 25 MILLIGRAM(S): at 05:13

## 2019-07-11 RX ADMIN — LISINOPRIL 5 MILLIGRAM(S): 2.5 TABLET ORAL at 05:13

## 2019-07-11 RX ADMIN — PIPERACILLIN AND TAZOBACTAM 25 GRAM(S): 4; .5 INJECTION, POWDER, LYOPHILIZED, FOR SOLUTION INTRAVENOUS at 18:40

## 2019-07-11 RX ADMIN — SODIUM CHLORIDE 100 MILLILITER(S): 9 INJECTION INTRAMUSCULAR; INTRAVENOUS; SUBCUTANEOUS at 12:34

## 2019-07-11 RX ADMIN — ATORVASTATIN CALCIUM 80 MILLIGRAM(S): 80 TABLET, FILM COATED ORAL at 21:54

## 2019-07-11 NOTE — H&P ADULT - PROBLEM SELECTOR PLAN 5
DVT ppx: SCDs  Regular diet Unclear source, but could be in setting of low perfusion.  - Will continue to monitor.   - Direct and indirect bili ordered for AM. Unclear source, but could be in the setting of low perfusion.  - Will continue to monitor.   - Direct and indirect bili ordered for AM.

## 2019-07-11 NOTE — PROGRESS NOTE ADULT - PROBLEM SELECTOR PLAN 3
Symptoms are c/f BPPV. Neurology consulted, appreciate recs.  F/u orthostatic vitals.  Can resume outpatient work-up for BPPV on d/c.  F/U electrolytes Symptoms are c/f BPPV. Neurology consulted, appreciate recs.  mildly positive orthostatics. c/w NS @100ml/hr x 1.5 liters; repeat orthostatics in a.m.  Can resume outpatient work-up for BPPV on d/c.  F/U electrolytes

## 2019-07-11 NOTE — PROGRESS NOTE ADULT - PROBLEM SELECTOR PLAN 4
Mildly hypokinetic LV, but overall preserved LV function and stage I diastolic CHF. Not currently in decompensated CHF. No SOB or LE edema.  No issues presently  - Continue lopressor 25 Q12H  - F/U TSH level. uclear etiology  check stool C&S, O&P, C.diff

## 2019-07-11 NOTE — H&P ADULT - NSHPREVIEWOFSYSTEMS_GEN_ALL_CORE
CONSTITUTIONAL: No weakness, fevers or chills  EYES/ENT: No visual changes;  +vertigo and dizziness, no throat pain   NECK: No pain or stiffness  RESPIRATORY: No cough, wheezing, hemoptysis; No shortness of breath  CARDIOVASCULAR: No chest pain or palpitations  GASTROINTESTINAL: No abdominal or epigastric pain. No nausea, vomiting, or hematemesis; No diarrhea or constipation. No melena or hematochezia.  GENITOURINARY: No dysuria, frequency or hematuria  NEUROLOGICAL: No numbness or weakness  SKIN: No itching, rashes

## 2019-07-11 NOTE — H&P ADULT - ASSESSMENT
54M with history of MI s/p 4 stents placed earlier this week, presenting with dizziness and subjective fevers at home, admitted for SIRS and r/o ACS.

## 2019-07-11 NOTE — H&P ADULT - HISTORY OF PRESENT ILLNESS
54M days s/p 4 cardiac stents p/w fever and dizziness x few hours. Four days ago at work patient experienced severe chest pain. Taken to [facility] in [place], dx stemi, had 2 stents placed. Hospital day #2 patient was given additional 2 stents. Yesterday patient discharged. This morning at midnight patient felt warm on and off and took Tylenol. At 3-4A patient went to bathroom and felt sudden onset room spinning and nausea. Cannot stand 2/2 fear he will fall. Denies visual disturbance, aphasia, dysphagia, or focal motor or sensory disturbance of face arm or leg.     	Developed chest pain on Saturday while at work.  pt was taken to [facility] where he had 2 stents placed on saturday and 2 stents placed on monday.  pt was discharged on Tuesday late afternoon.  Around midnight developed subjective fevers, at 3am while attempting to have bowel movement developed dizziness headache and sensation of room spinning with associated nausea.  denies sob, cp, back/arm/jaw pain, vomiting, diarrhea, cough, change in vision, blurry vision, numbness and or tingling 54M with 4 cardiac stents placed this week, presents to ED with fever (to 101F) and dizziness since today. Patient had CP at work on Saturday and was taken to [facility], where he was found to have STEMI. He had 2 stents placed on Saturday and 2 additional stents on Monday. Patient was discharged on Tuesday. Around midnight yesterday, he developed subjective fever. At 3am while attempting to have bowel movement developed dizziness headache and sensation of room spinning with associated nausea. Denies any focal neuro deficits.    In ED, patient was febrile to 101, HR was in 90s, leukocytosis to 15. Patient was started on zosyn x1 and admitted for SIRS. EKG shows NSR. 54M with 4 cardiac stents placed this week, presents to ED with fever (to 101F) and dizziness since today. Patient had CP at work on Saturday and was taken to [facility], where he was found to have STEMI. He had 2 stents placed on Saturday and 2 additional stents on Monday. Patient was discharged on Tuesday. Around midnight yesterday, he developed subjective fever. At 3am while attempting to have bowel movement, patient developed dizziness, headache and sensation of room spinning with associated nausea. Denies any focal neuro deficits.    In ED, patient was febrile to 101, HR was in 90s, leukocytosis to 15. Patient was started on zosyn x1 and admitted for SIRS. EKG shows NSR.

## 2019-07-11 NOTE — H&P ADULT - NSHPLABSRESULTS_GEN_ALL_CORE
CBC Full  -  ( 10 Jul 2019 10:04 )  WBC Count : 15.16 K/uL  RBC Count : 5.33 M/uL  Hemoglobin : 14.3 g/dL  Hematocrit : 43.9 %  Platelet Count - Automated : 226 K/uL  Mean Cell Volume : 82.4 fL  Mean Cell Hemoglobin : 26.8 pg  Mean Cell Hemoglobin Concentration : 32.6 %  Auto Neutrophil # : 13.22 K/uL  Auto Lymphocyte # : 0.85 K/uL  Auto Monocyte # : 0.87 K/uL  Auto Eosinophil # : 0.07 K/uL  Auto Basophil # : 0.06 K/uL  Auto Neutrophil % : 87.2 %  Auto Lymphocyte % : 5.6 %  Auto Monocyte % : 5.7 %  Auto Eosinophil % : 0.5 %  Auto Basophil % : 0.4 %    07-10    137  |  104  |  14  ----------------------------<  161<H>  4.5   |  24  |  1.07    Ca    9.2      10 Jul 2019 10:04    TPro  8.2  /  Alb  4.2  /  TBili  1.9<H>  /  DBili  x   /  AST  31  /  ALT  27  /  AlkPhos  76  07-10 CBC Full  -  ( 10 Jul 2019 10:04 )  WBC Count : 15.16 K/uL  RBC Count : 5.33 M/uL  Hemoglobin : 14.3 g/dL  Hematocrit : 43.9 %  Platelet Count - Automated : 226 K/uL  Mean Cell Volume : 82.4 fL  Mean Cell Hemoglobin : 26.8 pg  Mean Cell Hemoglobin Concentration : 32.6 %  Auto Neutrophil # : 13.22 K/uL  Auto Lymphocyte # : 0.85 K/uL  Auto Monocyte # : 0.87 K/uL  Auto Eosinophil # : 0.07 K/uL  Auto Basophil # : 0.06 K/uL  Auto Neutrophil % : 87.2 %  Auto Lymphocyte % : 5.6 %  Auto Monocyte % : 5.7 %  Auto Eosinophil % : 0.5 %  Auto Basophil % : 0.4 %    07-10    137  |  104  |  14  ----------------------------<  161<H>  4.5   |  24  |  1.07    Ca    9.2      10 Jul 2019 10:04    TPro  8.2  /  Alb  4.2  /  TBili  1.9<H>  /  DBili  x   /  AST  31  /  ALT  27  /  AlkPhos  76  07-10    TTE on 7/10  CONCLUSIONS:  1. Normal mitral valve. Minimal mitral regurgitation.  2. Normal trileaflet aortic valve. No aortic valve  regurgitation seen.  3. Overall preserved left ventricular systolic function.  The basal inferior wall, the basal anterolateral wall, the  mid inferior wall, and the mid inferoseptum are mildly  hypokinetic.  4. Mild diastolic dysfunction (Stage I).  5. Normal right ventricular size and function.  6. Normal pericardium with no pericardial effusion.

## 2019-07-11 NOTE — H&P ADULT - PROBLEM SELECTOR PLAN 1
Patient with HR>90, leukocytosis and febrile. Treated with IV zosyn x1.   - F/u blood and urine cultures.  - Continue zosyn q 8hrs, d/t recent PCI history.  - EKG shows NSR. Per history, dizziness sounds non-cardiac.  - Cath was done from radial approach. No signs of hematoma at access site and stable hgb. Patient with HR>90, leukocytosis and febrile. Treated with IV zosyn x1, but no clear source of infection. CXR shows no consolidation, UA is clear.  - F/u blood and urine cultures.  - Continue zosyn q 8hrs, d/t recent PCI history.  - EKG shows NSR. Per history, dizziness sounds non-cardiac.  - Cath was done through radial approach. No signs of hematoma at access site and stable hgb. Patient with HR>90, leukocytosis(15.16) and febrile (max = 101F). Treated with IV zosyn x1, but no clear source of infection. CXR shows no consolidation, UA is clear.  - F/u blood and urine cultures.  - Continue zosyn q 8hrs, d/t recent PCI history.  - EKG shows NSR. Per history, dizziness sounds non-cardiac.  - Cath was done through radial approach. No signs of hematoma at access site and stable hgb.

## 2019-07-11 NOTE — PROGRESS NOTE ADULT - PROBLEM SELECTOR PLAN 6
DVT ppx: SCDs  Regular diet Unclear source, but could be in the setting of low perfusion.  - Will continue to monitor.   - T. bili trending downward.

## 2019-07-11 NOTE — H&P ADULT - NSHPPHYSICALEXAM_GEN_ALL_CORE
Vital Signs Last 24 Hrs  T(C): 37.5 (11 Jul 2019 00:22), Max: 38.3 (10 Jul 2019 17:19)  T(F): 99.5 (11 Jul 2019 00:22), Max: 101 (10 Jul 2019 17:19)  HR: 85 (11 Jul 2019 00:22) (85 - 96)  BP: 127/88 (11 Jul 2019 00:22) (114/74 - 127/88)  BP(mean): --  RR: 18 (11 Jul 2019 00:22) (16 - 18)  SpO2: 99% (11 Jul 2019 00:22) (98% - 100%)    GENERAL: NAD, well-developed  HEAD:  Atraumatic, Normocephalic  EYES: EOMI, conjunctiva and sclera clear  NECK: Supple, No JVD  CHEST/LUNG: Clear to auscultation bilaterally; No wheeze, ronchi or rales  HEART: Regular rate and rhythm; No murmurs, rubs, or gallops  ABDOMEN: Soft, Nontender, Nondistended; Bowel sounds present  EXTREMITIES:  2+ Peripheral Pulses, No clubbing, cyanosis, or edema  PSYCH: AAOx3  NEUROLOGY: non-focal  SKIN: No rashes or lesions Vital Signs Last 24 Hrs  T(C): 37.5 (11 Jul 2019 00:22), Max: 38.3 (10 Jul 2019 17:19)  T(F): 99.5 (11 Jul 2019 00:22), Max: 101 (10 Jul 2019 17:19)  HR: 85 (11 Jul 2019 00:22) (85 - 96)  BP: 127/88 (11 Jul 2019 00:22) (114/74 - 127/88)  BP(mean): --  RR: 18 (11 Jul 2019 00:22) (16 - 18)  SpO2: 99% (11 Jul 2019 00:22) (98% - 100%)    GENERAL: NAD, well-developed  HEAD:  Atraumatic, Normocephalic  EYES: EOMI, conjunctiva and sclera clear  NECK: Supple, No JVD  CHEST/LUNG: Clear to auscultation bilaterally; No wheeze, rhonchi or rales  HEART: Regular rate and rhythm; No murmurs, rubs, or gallops  ABDOMEN: Soft, Nontender, Nondistended; Bowel sounds present  EXTREMITIES:  2+ Peripheral Pulses, No clubbing, cyanosis, or edema  PSYCH: AAOx3, mood and affect appropriate to situation  NEUROLOGY: non-focal  SKIN: No rashes or lesions

## 2019-07-11 NOTE — H&P ADULT - PROBLEM SELECTOR PLAN 4
Unclear source, but could be in setting of low perfusion.  - Will continue to monitor.   - Direct and indirect bili ordered for AM. Mildly hypokinetic LV, but overall preserve LV function. Not currently in decompensated CHF. No SOB or LE edema.  - Continue lopressor 25 bid. Mildly hypokinetic LV, but overall preserved LV function and stage I diastolic CHF. Not currently in decompensated CHF. No SOB or LE edema.  - Continue lopressor 25 bid. Mildly hypokinetic LV, but overall preserved LV function and stage I diastolic CHF. Not currently in decompensated CHF. No SOB or LE edema.  No issues presently  - Continue lopressor 25 Q12H  - F/U TSH level.

## 2019-07-11 NOTE — PROGRESS NOTE ADULT - SUBJECTIVE AND OBJECTIVE BOX
Patient is a 54y old  Male who presents with a chief complaint of Dizziness, diaphoresis (2019 03:03)      SUBJECTIVE / OVERNIGHT EVENTS:    MEDICATIONS  (STANDING):  aspirin  chewable 81 milliGRAM(s) Oral daily  atorvastatin 80 milliGRAM(s) Oral at bedtime  clopidogrel Tablet 75 milliGRAM(s) Oral daily  lisinopril 5 milliGRAM(s) Oral daily  metoprolol tartrate 25 milliGRAM(s) Oral every 12 hours  piperacillin/tazobactam IVPB.. 3.375 Gram(s) IV Intermittent every 8 hours  sodium chloride 0.9%. 1000 milliLiter(s) (100 mL/Hr) IV Continuous <Continuous>    MEDICATIONS  (PRN):      Vital Signs Last 24 Hrs  T(C): 36.6 (2019 13:49), Max: 38.3 (10 Jul 2019 17:19)  T(F): 97.8 (2019 13:49), Max: 101 (10 Jul 2019 17:19)  HR: 82 (2019 13:49) (80 - 92)  BP: 127/82 (2019 07:35) (116/72 - 129/88)  BP(mean): --  RR: 18 (2019 13:49) (16 - 18)  SpO2: 100% (2019 13:49) (98% - 100%)  CAPILLARY BLOOD GLUCOSE        I&O's Summary      PHYSICAL EXAM:  GENERAL: NAD, well-developed  HEAD:  Atraumatic, Normocephalic  EYES: EOMI, PERRLA, conjunctiva and sclera clear  NECK: Supple, No JVD  CHEST/LUNG: Clear to auscultation bilaterally; No wheeze  HEART: Regular rate and rhythm; No murmurs, rubs, or gallops  ABDOMEN: Soft, Nontender, Nondistended; Bowel sounds present  EXTREMITIES:  2+ Peripheral Pulses, No clubbing, cyanosis, or edema  PSYCH: AAOx3  NEUROLOGY: non-focal  SKIN: No rashes or lesions    LABS:                        13.2   10.25 )-----------( 210      ( 2019 06:00 )             40.7     07-11    137  |  107  |  8   ----------------------------<  138<H>  3.6   |  19<L>  |  0.79    Ca    8.4      2019 06:00  Phos  2.3     07-11  Mg     1.8     07-11    TPro  7.3  /  Alb  3.7  /  TBili  1.4<H>  /  DBili  0.3<H>  /  AST  30  /  ALT  27  /  AlkPhos  66  07-11    PT/INR - ( 10 Jul 2019 10:04 )   PT: 13.7 SEC;   INR: 1.20          PTT - ( 10 Jul 2019 10:04 )  PTT:27.3 SEC      Urinalysis Basic - ( 10 Jul 2019 18:17 )    Color: YELLOW / Appearance: Lt TURBID / S.020 / pH: 6.5  Gluc: NEGATIVE / Ketone: NEGATIVE  / Bili: NEGATIVE / Urobili: TRACE   Blood: SMALL / Protein: 30 / Nitrite: NEGATIVE   Leuk Esterase: NEGATIVE / RBC: 0-2 / WBC 2-5   Sq Epi: OCC / Non Sq Epi: x / Bacteria: SMALL        RADIOLOGY & ADDITIONAL TESTS:    Imaging Personally Reviewed:    Consultant(s) Notes Reviewed:      Care Discussed with Consultants/Other Providers: Patient is a 54y old  Male who presents with a chief complaint of Dizziness, diaphoresis (2019 03:03)      SUBJECTIVE / OVERNIGHT EVENTS:  Patient c/o watery diarrhea x 4 and HA with ambulation.  Denies cp, SOB, abdominal pain, N/V/D     MEDICATIONS  (STANDING):  aspirin  chewable 81 milliGRAM(s) Oral daily  atorvastatin 80 milliGRAM(s) Oral at bedtime  clopidogrel Tablet 75 milliGRAM(s) Oral daily  lisinopril 5 milliGRAM(s) Oral daily  metoprolol tartrate 25 milliGRAM(s) Oral every 12 hours  piperacillin/tazobactam IVPB.. 3.375 Gram(s) IV Intermittent every 8 hours  sodium chloride 0.9%. 1000 milliLiter(s) (100 mL/Hr) IV Continuous <Continuous>    MEDICATIONS  (PRN):      Vital Signs Last 24 Hrs  T(C): 36.6 (2019 13:49), Max: 38.3 (10 Jul 2019 17:19)  T(F): 97.8 (2019 13:49), Max: 101 (10 Jul 2019 17:19)  HR: 82 (2019 13:49) (80 - 92)  BP: 127/82 (2019 07:35) (116/72 - 129/88)  BP(mean): --  RR: 18 (2019 13:49) (16 - 18)  SpO2: 100% (2019 13:49) (98% - 100%)  CAPILLARY BLOOD GLUCOSE        I&O's Summary      PHYSICAL EXAM:  GENERAL: NAD, well-developed  HEAD:  Atraumatic, Normocephalic  EYES: EOMI, PERRLA, conjunctiva and sclera clear  NECK: Supple, No JVD  CHEST/LUNG: Clear to auscultation bilaterally; No wheeze  HEART: Regular rate and rhythm; No murmurs, rubs, or gallops  ABDOMEN: Soft, Nontender, Nondistended; Bowel sounds present  EXTREMITIES:  2+ Peripheral Pulses, No clubbing, cyanosis, or edema  PSYCH: AAOx3  NEUROLOGY: non-focal  SKIN: No rashes or lesions    LABS:                        13.2   10.25 )-----------( 210      ( 2019 06:00 )             40.7     07-11    137  |  107  |  8   ----------------------------<  138<H>  3.6   |  19<L>  |  0.79    Ca    8.4      2019 06:00  Phos  2.3       Mg     1.8         TPro  7.3  /  Alb  3.7  /  TBili  1.4<H>  /  DBili  0.3<H>  /  AST  30  /  ALT  27  /  AlkPhos  66  07-11    PT/INR - ( 10 Jul 2019 10:04 )   PT: 13.7 SEC;   INR: 1.20          PTT - ( 10 Jul 2019 10:04 )  PTT:27.3 SEC      Urinalysis Basic - ( 10 Jul 2019 18:17 )    Color: YELLOW / Appearance: Lt TURBID / S.020 / pH: 6.5  Gluc: NEGATIVE / Ketone: NEGATIVE  / Bili: NEGATIVE / Urobili: TRACE   Blood: SMALL / Protein: 30 / Nitrite: NEGATIVE   Leuk Esterase: NEGATIVE / RBC: 0-2 / WBC 2-5   Sq Epi: OCC / Non Sq Epi: x / Bacteria: SMALL        RADIOLOGY & ADDITIONAL TESTS:    Imaging Personally Reviewed:    Consultant(s) Notes Reviewed:      Care Discussed with Consultants/Other Providers:

## 2019-07-11 NOTE — H&P ADULT - NSHPSOCIALHISTORY_GEN_ALL_CORE
Lives with wife at home. Denies alcohol, cigarette or drug use. Lives with wife at home.   Denies alcohol, cigarette or drug use.

## 2019-07-11 NOTE — PROGRESS NOTE ADULT - ASSESSMENT
54 y.o. Male  w/ hx chronic diastolic HF, CAD s/p MI s/p 4 stents placed earlier this week, p/w dizziness and subjective fevers at home, admitted for SIRS and r/o ACS.

## 2019-07-11 NOTE — H&P ADULT - NSICDXPASTSURGICALHX_GEN_ALL_CORE_FT
PAST SURGICAL HISTORY:  No significant past surgical history PAST SURGICAL HISTORY:  Stented coronary artery ~ earlier 07/2019

## 2019-07-11 NOTE — PROGRESS NOTE ADULT - PROBLEM SELECTOR PLAN 5
Unclear source, but could be in the setting of low perfusion.  - Will continue to monitor.   - Direct and indirect bili ordered for AM. Mildly hypokinetic LV, but overall preserved LV function and stage I diastolic CHF. Not currently in decompensated CHF. No SOB or LE edema.  No issues presently  - Continue lopressor 25 Q12H

## 2019-07-11 NOTE — H&P ADULT - PROBLEM SELECTOR PLAN 2
Patient with significant family history of CAD, s/p 4 stents placed this week in setting of STEMI.  Will attempt to get cath report from [facility].  - Continue ASA, plavix and atorvastatin. Patient with significant family history of CAD, s/p 4 stents placed this week in setting of STEMI.  Will attempt to get cath report from [facility].  - Continue ASA, plavix and atorvastatin.  - TTE shows EF of 55% Patient with significant family history of CAD, s/p 4 stents placed this week at [facility], in the setting of STEMI.  Will attempt to get cath report.  - Continue ASA, Plavix and atorvastatin.  - TTE shows EF of 55%, and Stage I Diastolic dysfunction

## 2019-07-11 NOTE — H&P ADULT - PROBLEM SELECTOR PLAN 3
Symptoms are c/f BPPV. Neurology consulted appreciate recs.   TTE shows EF of Symptoms are c/f BPPV. Neurology consulted, appreciate recs.  F/u orthostatic vitals. Symptoms are c/f BPPV. Neurology consulted, appreciate recs.  F/u orthostatic vitals.  Can resume outpatient work-up for BPPV on d/c. Symptoms are c/f BPPV. Neurology consulted, appreciate recs.  F/u orthostatic vitals.  Can resume outpatient work-up for BPPV on d/c.  F/U electrolytes

## 2019-07-11 NOTE — H&P ADULT - NSICDXPASTMEDICALHX_GEN_ALL_CORE_FT
PAST MEDICAL HISTORY:  HTN (hypertension)     MI (myocardial infarction) with 4 stents placed PAST MEDICAL HISTORY:  Diastolic heart failure ~ [place]    HTN (hypertension)     MI (myocardial infarction) with 4 stents placed

## 2019-07-11 NOTE — H&P ADULT - PROBLEM SELECTOR PROBLEM 4
Hyperbilirubinemia Systolic CHF Systolic and diastolic CHF, acute Diastolic heart failure, unspecified HF chronicity

## 2019-07-12 ENCOUNTER — TRANSCRIPTION ENCOUNTER (OUTPATIENT)
Age: 54
End: 2019-07-12

## 2019-07-12 VITALS
TEMPERATURE: 98 F | DIASTOLIC BLOOD PRESSURE: 81 MMHG | OXYGEN SATURATION: 98 % | HEART RATE: 74 BPM | RESPIRATION RATE: 18 BRPM | SYSTOLIC BLOOD PRESSURE: 124 MMHG

## 2019-07-12 LAB
ANION GAP SERPL CALC-SCNC: 11 MMO/L — SIGNIFICANT CHANGE UP (ref 7–14)
BACTERIA UR CULT: SIGNIFICANT CHANGE UP
BASOPHILS # BLD AUTO: 0.05 K/UL — SIGNIFICANT CHANGE UP (ref 0–0.2)
BASOPHILS NFR BLD AUTO: 0.7 % — SIGNIFICANT CHANGE UP (ref 0–2)
BUN SERPL-MCNC: 8 MG/DL — SIGNIFICANT CHANGE UP (ref 7–23)
C DIFF TOX GENS STL QL NAA+PROBE: SIGNIFICANT CHANGE UP
CALCIUM SERPL-MCNC: 8.8 MG/DL — SIGNIFICANT CHANGE UP (ref 8.4–10.5)
CHLORIDE SERPL-SCNC: 108 MMOL/L — HIGH (ref 98–107)
CO2 SERPL-SCNC: 19 MMOL/L — LOW (ref 22–31)
CREAT SERPL-MCNC: 0.76 MG/DL — SIGNIFICANT CHANGE UP (ref 0.5–1.3)
EOSINOPHIL # BLD AUTO: 0.15 K/UL — SIGNIFICANT CHANGE UP (ref 0–0.5)
EOSINOPHIL NFR BLD AUTO: 2 % — SIGNIFICANT CHANGE UP (ref 0–6)
GLUCOSE SERPL-MCNC: 135 MG/DL — HIGH (ref 70–99)
HCT VFR BLD CALC: 40.6 % — SIGNIFICANT CHANGE UP (ref 39–50)
HGB BLD-MCNC: 13.2 G/DL — SIGNIFICANT CHANGE UP (ref 13–17)
IMM GRANULOCYTES NFR BLD AUTO: 0.4 % — SIGNIFICANT CHANGE UP (ref 0–1.5)
LYMPHOCYTES # BLD AUTO: 1.7 K/UL — SIGNIFICANT CHANGE UP (ref 1–3.3)
LYMPHOCYTES # BLD AUTO: 23 % — SIGNIFICANT CHANGE UP (ref 13–44)
MAGNESIUM SERPL-MCNC: 1.9 MG/DL — SIGNIFICANT CHANGE UP (ref 1.6–2.6)
MCHC RBC-ENTMCNC: 26.6 PG — LOW (ref 27–34)
MCHC RBC-ENTMCNC: 32.5 % — SIGNIFICANT CHANGE UP (ref 32–36)
MCV RBC AUTO: 81.9 FL — SIGNIFICANT CHANGE UP (ref 80–100)
MONOCYTES # BLD AUTO: 0.81 K/UL — SIGNIFICANT CHANGE UP (ref 0–0.9)
MONOCYTES NFR BLD AUTO: 11 % — SIGNIFICANT CHANGE UP (ref 2–14)
NEUTROPHILS # BLD AUTO: 4.65 K/UL — SIGNIFICANT CHANGE UP (ref 1.8–7.4)
NEUTROPHILS NFR BLD AUTO: 62.9 % — SIGNIFICANT CHANGE UP (ref 43–77)
NRBC # FLD: 0 K/UL — SIGNIFICANT CHANGE UP (ref 0–0)
PLATELET # BLD AUTO: 225 K/UL — SIGNIFICANT CHANGE UP (ref 150–400)
PMV BLD: 10 FL — SIGNIFICANT CHANGE UP (ref 7–13)
POTASSIUM SERPL-MCNC: 3.5 MMOL/L — SIGNIFICANT CHANGE UP (ref 3.5–5.3)
POTASSIUM SERPL-SCNC: 3.5 MMOL/L — SIGNIFICANT CHANGE UP (ref 3.5–5.3)
RBC # BLD: 4.96 M/UL — SIGNIFICANT CHANGE UP (ref 4.2–5.8)
RBC # FLD: 12.8 % — SIGNIFICANT CHANGE UP (ref 10.3–14.5)
SODIUM SERPL-SCNC: 138 MMOL/L — SIGNIFICANT CHANGE UP (ref 135–145)
SPECIMEN SOURCE: SIGNIFICANT CHANGE UP
SPECIMEN SOURCE: SIGNIFICANT CHANGE UP
WBC # BLD: 7.39 K/UL — SIGNIFICANT CHANGE UP (ref 3.8–10.5)
WBC # FLD AUTO: 7.39 K/UL — SIGNIFICANT CHANGE UP (ref 3.8–10.5)

## 2019-07-12 PROCEDURE — 99239 HOSP IP/OBS DSCHRG MGMT >30: CPT

## 2019-07-12 PROCEDURE — 99221 1ST HOSP IP/OBS SF/LOW 40: CPT

## 2019-07-12 RX ADMIN — PIPERACILLIN AND TAZOBACTAM 25 GRAM(S): 4; .5 INJECTION, POWDER, LYOPHILIZED, FOR SOLUTION INTRAVENOUS at 09:59

## 2019-07-12 RX ADMIN — Medication 25 MILLIGRAM(S): at 05:42

## 2019-07-12 RX ADMIN — Medication 25 MILLIGRAM(S): at 17:37

## 2019-07-12 RX ADMIN — CLOPIDOGREL BISULFATE 75 MILLIGRAM(S): 75 TABLET, FILM COATED ORAL at 12:43

## 2019-07-12 RX ADMIN — Medication 81 MILLIGRAM(S): at 12:43

## 2019-07-12 RX ADMIN — PIPERACILLIN AND TAZOBACTAM 25 GRAM(S): 4; .5 INJECTION, POWDER, LYOPHILIZED, FOR SOLUTION INTRAVENOUS at 02:19

## 2019-07-12 RX ADMIN — LISINOPRIL 5 MILLIGRAM(S): 2.5 TABLET ORAL at 05:42

## 2019-07-12 NOTE — PROGRESS NOTE ADULT - PROBLEM SELECTOR PLAN 2
Patient with significant family history of CAD, s/p 4 stents placed this week at [facility], in the setting of STEMI.  Will attempt to get cath report.  - Continue ASA, Plavix and atorvastatin.  - TTE shows EF of 55%, and Stage I Diastolic dysfunction
Patient with significant family history of CAD, s/p 4 stents placed this week at [facility], in the setting of STEMI.  - Continue ASA, Plavix and atorvastatin.  - TTE shows EF of 55%, and Stage I Diastolic dysfunction

## 2019-07-12 NOTE — PROGRESS NOTE ADULT - PROBLEM SELECTOR PLAN 5
Mildly hypokinetic LV, but overall preserved LV function and stage I diastolic CHF. Not currently in decompensated CHF. No SOB or LE edema.  No issues presently  - Continue lopressor 25 Q12H

## 2019-07-12 NOTE — PROGRESS NOTE ADULT - SUBJECTIVE AND OBJECTIVE BOX
Patient is a 54y old  Male who presents with a chief complaint of Dizziness, diaphoresis (2019 14:38)      SUBJECTIVE / OVERNIGHT EVENTS:  Diarrhea resolved. Patient has no new complaints. Denies cp, SOB, abdominal pain, N/V/D     MEDICATIONS  (STANDING):  aspirin  chewable 81 milliGRAM(s) Oral daily  atorvastatin 80 milliGRAM(s) Oral at bedtime  clopidogrel Tablet 75 milliGRAM(s) Oral daily  lisinopril 5 milliGRAM(s) Oral daily  metoprolol tartrate 25 milliGRAM(s) Oral every 12 hours  piperacillin/tazobactam IVPB.. 3.375 Gram(s) IV Intermittent every 8 hours  sodium chloride 0.9%. 1000 milliLiter(s) (100 mL/Hr) IV Continuous <Continuous>    MEDICATIONS  (PRN):      Vital Signs Last 24 Hrs  T(C): 36.8 (2019 12:50), Max: 36.8 (2019 12:50)  T(F): 98.2 (2019 12:50), Max: 98.2 (2019 12:50)  HR: 67 (2019 12:50) (67 - 80)  BP: 119/78 (2019 12:50) (119/78 - 141/89)  BP(mean): --  RR: 18 (2019 12:50) (18 - 18)  SpO2: 98% (2019 12:50) (98% - 100%)  CAPILLARY BLOOD GLUCOSE        I&O's Summary    2019 07:01  -  2019 07:00  --------------------------------------------------------  IN: 418 mL / OUT: 850 mL / NET: -432 mL        PHYSICAL EXAM:   GENERAL: NAD, well-developed  HEAD:  Atraumatic, Normocephalic  EYES: EOMI, PERRLA, conjunctiva and sclera clear  NECK: Supple, No JVD  CHEST/LUNG: Clear to auscultation bilaterally; No wheeze  HEART: Regular rate and rhythm; No murmurs, rubs, or gallops  ABDOMEN: Soft, Nontender, Nondistended; Bowel sounds present  EXTREMITIES:  2+ Peripheral Pulses, No clubbing, cyanosis, or edema  PSYCH: AAOx3  NEUROLOGY: non-focal  SKIN: No rashes or lesions    LABS:                        13.2   7.39  )-----------( 225      ( 2019 06:22 )             40.6     07-12    138  |  108<H>  |  8   ----------------------------<  135<H>  3.5   |  19<L>  |  0.76    Ca    8.8      2019 06:22  Phos  2.3     07-11  Mg     1.9     -12    TPro  7.3  /  Alb  3.7  /  TBili  1.4<H>  /  DBili  0.3<H>  /  AST  30  /  ALT  27  /  AlkPhos  66  07-11          Urinalysis Basic - ( 10 Jul 2019 18:17 )    Color: YELLOW / Appearance: Lt TURBID / S.020 / pH: 6.5  Gluc: NEGATIVE / Ketone: NEGATIVE  / Bili: NEGATIVE / Urobili: TRACE   Blood: SMALL / Protein: 30 / Nitrite: NEGATIVE   Leuk Esterase: NEGATIVE / RBC: 0-2 / WBC 2-5   Sq Epi: OCC / Non Sq Epi: x / Bacteria: SMALL        RADIOLOGY & ADDITIONAL TESTS:    Imaging Personally Reviewed:    Consultant(s) Notes Reviewed:      Care Discussed with Consultants/Other Providers:

## 2019-07-12 NOTE — PROGRESS NOTE ADULT - ASSESSMENT
54 y.o. Male  w/ hx chronic diastolic HF, CAD s/p MI s/p 4 stents placed earlier this week, p/w dizziness and subjective fevers at home, admitted for SIRS and r/o ACS. 54 y.o. Male  w/ hx chronic diastolic HF, CAD s/p MI s/p 4 stents placed earlier this week, p/w dizziness and subjective fevers at home, admitted for SIRS. d/c 40 minutes.

## 2019-07-12 NOTE — DISCHARGE NOTE NURSING/CASE MANAGEMENT/SOCIAL WORK - NSDCPEPT PROEDHF_GEN_ALL_CORE
Low salt diet/Report signs and symptoms to primary care provider/Activities as tolerated/Call primary care provider for follow up after discharge/Monitor weight daily

## 2019-07-12 NOTE — DISCHARGE NOTE PROVIDER - CARE PROVIDER_API CALL
Primary care doctor,   Phone: (   )    -  Fax: (   )    -  Follow Up Time:     cardiology,   Phone: (   )    -  Fax: (   )    -  Follow Up Time:

## 2019-07-12 NOTE — PROGRESS NOTE ADULT - PROBLEM SELECTOR PLAN 6
Unclear source, but could be in the setting of low perfusion.  - Will continue to monitor.   - T. bili trending downward.

## 2019-07-12 NOTE — DISCHARGE NOTE NURSING/CASE MANAGEMENT/SOCIAL WORK - NSDCDPATPORTLINK_GEN_ALL_CORE
You can access the [facility] Patient Portal, offered by [facility], by registering with the following website: [email or web address]

## 2019-07-12 NOTE — DISCHARGE NOTE PROVIDER - HOSPITAL COURSE
HPI:    54M with 4 cardiac stents placed this week, presents to ED with fever (to 101F) and dizziness since today. Patient had CP at work on Saturday and was taken to [facility], where he was found to have STEMI. He had 2 stents placed on Saturday and 2 additional stents on Monday. Patient was discharged on Tuesday. Around midnight yesterday, he developed subjective fever. At 3am while attempting to have bowel movement, patient developed dizziness, headache and sensation of room spinning with associated nausea. Denies any focal neuro deficits.        In ED, patient was febrile to 101, HR was in 90s, leukocytosis to 15. Patient was started on zosyn x1 and admitted for SIRS. EKG shows NSR. (11 Jul 2019 03:03)                + SIRS (systemic inflammatory response syndrome). Patient with HR>90, leukocytosis(15.16) and febrile (max = 101F). Treated with IV zosyn x1, but no clear source of infection. CXR shows no consolidation, UA is clear.    blood and urine cultures NTD    d/c zosyn q 8hrs    - EKG shows NSR. Per history, dizziness sounds non-cardiac.    - Cath was done through radial approach. No signs of hematoma at access site and stable hgb.         + CAD S/P percutaneous coronary angioplasty. Patient with significant family history of CAD, s/p 4 stents placed this week at [facility], in the setting of STEMI.    - Continue ASA, Plavix and atorvastatin.    - TTE shows EF of 55%, and Stage I Diastolic dysfunction.         + Vertigo.  Symptoms are c/f BPPV. Neurology consulted, appreciate recs.    mildly positive orthostatics resolved with NS @100ml/hr x 1.5 liters.     Can resume outpatient work-up for BPPV on d/c.         + Diarrhea, unspecified type. unclear etiology, resolved.         + Diastolic heart failure, unspecified HF chronicity.  Plan: Mildly hypokinetic LV, but overall preserved LV function and stage I diastolic CHF. Not currently in decompensated CHF. No SOB or LE edema.    No issues presently    - Continue lopressor 25 Q12H.         + Hyperbilirubinemia. Unclear source, but could be in the setting of low perfusion. T. bili trending downward. HPI:    54M with 4 cardiac stents placed this week, presents to ED with fever (to 101F) and dizziness since today. Patient had CP at work on Saturday and was taken to [facility], where he was found to have STEMI. He had 2 stents placed on Saturday and 2 additional stents on Monday. Patient was discharged on Tuesday. Around midnight yesterday, he developed subjective fever. At 3am while attempting to have bowel movement, patient developed dizziness, headache and sensation of room spinning with associated nausea. Denies any focal neuro deficits.        In ED, patient was febrile to 101, HR was in 90s, leukocytosis to 15. Patient was started on zosyn x1 and admitted for SIRS. EKG shows NSR. (11 Jul 2019 03:03)                + SIRS (systemic inflammatory response syndrome). Patient with HR>90, leukocytosis(15.16) and febrile (max = 101F). Treated with IV zosyn x1, but no clear source of infection. CXR shows no consolidation, UA is clear.    blood and urine cultures NTD    d/c zosyn q 8hrs    - EKG shows NSR. Per history, dizziness sounds non-cardiac.    - Cath was done through radial approach. No signs of hematoma at access site and stable hgb.         + CAD S/P percutaneous coronary angioplasty. Patient with significant family history of CAD, s/p 4 stents placed this week at [facility], in the setting of STEMI.    - Continue ASA, Plavix and atorvastatin.    - TTE shows EF of 55%, and Stage I Diastolic dysfunction.         + Vertigo.  Symptoms are c/f BPPV. Neurology consulted, appreciate recs.    mildly positive orthostatics resolved with NS @100ml/hr x 1.5 liters.     Can resume outpatient work-up for BPPV on d/c.         + Diarrhea, unspecified type. unclear etiology, resolved., Cdiff negative        + Diastolic heart failure, unspecified HF chronicity.  Plan: Mildly hypokinetic LV, but overall preserved LV function and stage I diastolic CHF. Not currently in decompensated CHF. No SOB or LE edema.    No issues presently    - Continue lopressor 25 Q12H.         + Hyperbilirubinemia. Unclear source, but could be in the setting of low perfusion. T. bili trending downward.        Discussed case with Dr. Schulte and pt cleared for discharge home.

## 2019-07-12 NOTE — PROGRESS NOTE ADULT - PROBLEM SELECTOR PLAN 3
Symptoms are c/f BPPV. Neurology consulted, appreciate recs.  mildly positive orthostatics resolved with NS @100ml/hr x 1.5 liters.   Can resume outpatient work-up for BPPV on d/c.

## 2019-07-12 NOTE — PROGRESS NOTE ADULT - PROBLEM SELECTOR PLAN 1
Patient with HR>90, leukocytosis(15.16) and febrile (max = 101F). Treated with IV zosyn x1, but no clear source of infection. CXR shows no consolidation, UA is clear.  blood and urine cultures NTD  d/c zosyn q 8hrs  - EKG shows NSR. Per history, dizziness sounds non-cardiac.  - Cath was done through radial approach. No signs of hematoma at access site and stable hgb.
Patient with HR>90, leukocytosis(15.16) and febrile (max = 101F). Treated with IV zosyn x1, but no clear source of infection. CXR shows no consolidation, UA is clear.  - F/u blood and urine cultures.  - Continue zosyn q 8hrs, d/t recent PCI history.  - EKG shows NSR. Per history, dizziness sounds non-cardiac.  - Cath was done through radial approach. No signs of hematoma at access site and stable hgb.

## 2019-07-12 NOTE — DISCHARGE NOTE PROVIDER - NSDCCPCAREPLAN_GEN_ALL_CORE_FT
PRINCIPAL DISCHARGE DIAGNOSIS  Diagnosis: SIRS (systemic inflammatory response syndrome)  Assessment and Plan of Treatment: Blood cultures negative, no obvious sourse of infection. Your white blood cell count has normalized. Call your doctor if develop fever at home.      SECONDARY DISCHARGE DIAGNOSES  Diagnosis: Dizziness  Assessment and Plan of Treatment: CT head was negative. Likely due to orthostatic hypotension, improved after IV fluids. Give your body time to adjust to position changes prior to standing from laying or sitting position.    Diagnosis: Prediabetes  Assessment and Plan of Treatment: low salt, fat and carbohydrate diet, minimize glucose intake.  Exercise daily for at least 30 minutes and weight loss.  Follow up with primary care physician routine Hemoglobin A1C checks and management.    Diagnosis: Diarrhea, unspecified type  Assessment and Plan of Treatment: Resolved, continue to monitor. Follow up with your primary care physician for further monitoring in 1-2 weeks. Please call to arrange appointment.    Diagnosis: CAD S/P percutaneous coronary angioplasty  Assessment and Plan of Treatment: Continue aspirin and Plavix, do not stop unless instructed by your physician.  Continue low salt, fat, cholesterol and carbohydrate diet. Follow up with cardiologist and primary care physician's routine appointment. PRINCIPAL DISCHARGE DIAGNOSIS  Diagnosis: SIRS (systemic inflammatory response syndrome)  Assessment and Plan of Treatment: Blood cultures negative, no obvious sourse of infection was found. Your white blood cell count has normalized. Call your doctor if develop fever at home.      SECONDARY DISCHARGE DIAGNOSES  Diagnosis: Dizziness  Assessment and Plan of Treatment: CT head was negative. Likely due to orthostatic hypotension, improved after IV fluids. Give your body time to adjust to position changes prior to standing from laying or sitting position.    Diagnosis: Prediabetes  Assessment and Plan of Treatment: low salt, fat and carbohydrate diet, minimize glucose intake.  Exercise daily for at least 30 minutes and weight loss.  Follow up with primary care physician routine Hemoglobin A1C checks and management.    Diagnosis: Diarrhea, unspecified type  Assessment and Plan of Treatment: Resolved, continue to monitor. Follow up with your primary care physician for further monitoring in 1-2 weeks. Please call to arrange appointment.    Diagnosis: CAD S/P percutaneous coronary angioplasty  Assessment and Plan of Treatment: Continue aspirin and Plavix, do not stop unless instructed by your physician.  Continue low salt, fat, cholesterol and carbohydrate diet. Follow up with cardiologist and primary care physician's routine appointment.

## 2019-07-12 NOTE — DISCHARGE NOTE PROVIDER - PROVIDER TOKENS
FREE:[LAST:[Primary care doctor],PHONE:[(   )    -],FAX:[(   )    -]],FREE:[LAST:[cardiology],PHONE:[(   )    -],FAX:[(   )    -]]

## 2019-07-13 LAB
GI PCR PANEL, STOOL: SIGNIFICANT CHANGE UP
SPECIMEN SOURCE: SIGNIFICANT CHANGE UP

## 2019-07-14 LAB — BACTERIA STL CULT: SIGNIFICANT CHANGE UP

## 2019-07-15 LAB
BACTERIA BLD CULT: SIGNIFICANT CHANGE UP
BACTERIA BLD CULT: SIGNIFICANT CHANGE UP

## 2019-09-10 ENCOUNTER — EMERGENCY (EMERGENCY)
Facility: HOSPITAL | Age: 54
LOS: 1 days | Discharge: HOME | End: 2019-09-10
Admitting: EMERGENCY MEDICINE
Payer: COMMERCIAL

## 2019-09-10 VITALS
OXYGEN SATURATION: 100 % | HEIGHT: 68 IN | SYSTOLIC BLOOD PRESSURE: 111 MMHG | TEMPERATURE: 98 F | DIASTOLIC BLOOD PRESSURE: 71 MMHG | WEIGHT: 169.98 LBS | RESPIRATION RATE: 16 BRPM | HEART RATE: 62 BPM

## 2019-09-10 DIAGNOSIS — Z95.5 PRESENCE OF CORONARY ANGIOPLASTY IMPLANT AND GRAFT: Chronic | ICD-10-CM

## 2019-09-10 PROBLEM — I10 ESSENTIAL (PRIMARY) HYPERTENSION: Chronic | Status: ACTIVE | Noted: 2019-07-10

## 2019-09-10 PROBLEM — I21.9 ACUTE MYOCARDIAL INFARCTION, UNSPECIFIED: Chronic | Status: ACTIVE | Noted: 2019-07-10

## 2019-09-10 PROBLEM — I50.30 UNSPECIFIED DIASTOLIC (CONGESTIVE) HEART FAILURE: Chronic | Status: ACTIVE | Noted: 2019-07-11

## 2019-09-10 PROCEDURE — 99282 EMERGENCY DEPT VISIT SF MDM: CPT

## 2019-09-10 NOTE — ED PROVIDER NOTE - PMH
Diastolic heart failure  ~ State I  HTN (hypertension)    MI (myocardial infarction)  with 4 stents placed

## 2019-09-10 NOTE — ED ADULT TRIAGE NOTE - CHIEF COMPLAINT QUOTE
Hematuria today denies pain   PMH: Cardiac Stents on Plavix Hematuria today  denies pain   PMH: Cardiac Stents on Plavix, MI, HTN

## 2019-09-10 NOTE — ED PROVIDER NOTE - PROGRESS NOTE DETAILS
CECE ESPINO:  Given no active bleeding, pt appropriate for discharge at this time.  Pt medically stable.  Pt exhibits understanding on how to properly take his medications.  PT to follow up with PMD and cardiologist.  Pt exhibits understanding of return precautions.

## 2019-09-10 NOTE — ED PROVIDER NOTE - OBJECTIVE STATEMENT
Pt is a 53 y/o M PMHx HTN, MI h/o stents p/w bleeding from scrotum today.  Pt states he took Plavix and Aspirin at 12 Noon on 9/8/19 then again accidentally at 10 PM on 9/8/19.  Pt did not take any yesterday and discussed what happened with staff at his cardiologist's office; he was told at that time to resume Plavix and Aspirin today at 9 AM, which he did.  This afternoon, after showering, pt was drying himself off with towel and getting dressed when he noticed bleeding from a small wound on his scrotum, which has completely resolved for the past two hours.  Pt denies any fevers, chills, chest pain, lightheadedness, palpitations, dizziness, SOB, hemoptysis, bleeding gums, melena, brbpr, hematuria, hematemesis, testicular pain, abdominal pain, flank pain, lightheadedness or any other specific complaints.  Pt insists there was no bleeding from penis or rectum, but rather mild oozing bleeding from a small wound on scrotum overlying left testicle.

## 2019-09-10 NOTE — ED PROVIDER NOTE - NONTENDER LOCATION
right lower quadrant/umbilical/right costovertebral angle/left upper quadrant/right upper quadrant/suprapubic/periumbilical/left costovertebral angle/left lower quadrant

## 2019-09-10 NOTE — ED PROVIDER NOTE - PATIENT PORTAL LINK FT
You can access the FollowMyHealth Patient Portal offered by [facility] by registering at the following website: [email or web address]. By joining [email or web address]’s FollowMyHealth portal, you will also be able to view your health information using other applications (apps) compatible with our system.

## 2019-09-10 NOTE — ED PROVIDER NOTE - CLINICAL SUMMARY MEDICAL DECISION MAKING FREE TEXT BOX
Pt is a 53 y/o M PMHx HTN, MI h/o stents p/w bleeding from scrotum today. -- bleeding from wound now resolved -- pt appropriate for discharge at this time with strict return precautions

## 2019-09-10 NOTE — ED PROVIDER NOTE - NSFOLLOWUPINSTRUCTIONS_ED_ALL_ED_FT
Advance activity as tolerated.  Continue all previously prescribed medications as directed unless otherwise instructed.  Follow up with your primary care physician and cardiologist in 48-72 hours- bring copies of your results.  Return to the ER for worsening or persistent symptoms, including but not limited to any worsening/persistent bleeding, bloody urine, black or bloody stools, coughing up blood, vomiting blood, lightheadedness, dizziness, abdominal pain, headache and/or ANY NEW OR CONCERNING SYMPTOMS. If you have issues obtaining follow up, please call: [phone] to obtain a doctor or specialist who takes your insurance in your area.  You may call [phone] to make an appointment with the internal medicine clinic.

## 2020-01-05 ENCOUNTER — INPATIENT (INPATIENT)
Facility: HOSPITAL | Age: 55
LOS: 1 days | Discharge: HOME | End: 2020-01-07
Attending: INTERNAL MEDICINE | Admitting: INTERNAL MEDICINE
Payer: COMMERCIAL

## 2020-01-05 VITALS
SYSTOLIC BLOOD PRESSURE: 86 MMHG | RESPIRATION RATE: 16 BRPM | TEMPERATURE: 97 F | DIASTOLIC BLOOD PRESSURE: 61 MMHG | HEART RATE: 70 BPM | OXYGEN SATURATION: 100 %

## 2020-01-05 DIAGNOSIS — E86.1 HYPOVOLEMIA: ICD-10-CM

## 2020-01-05 DIAGNOSIS — R55 SYNCOPE AND COLLAPSE: ICD-10-CM

## 2020-01-05 DIAGNOSIS — I10 ESSENTIAL (PRIMARY) HYPERTENSION: ICD-10-CM

## 2020-01-05 DIAGNOSIS — Z95.5 PRESENCE OF CORONARY ANGIOPLASTY IMPLANT AND GRAFT: Chronic | ICD-10-CM

## 2020-01-05 DIAGNOSIS — Z29.9 ENCOUNTER FOR PROPHYLACTIC MEASURES, UNSPECIFIED: ICD-10-CM

## 2020-01-05 DIAGNOSIS — I25.10 ATHEROSCLEROTIC HEART DISEASE OF NATIVE CORONARY ARTERY WITHOUT ANGINA PECTORIS: ICD-10-CM

## 2020-01-05 DIAGNOSIS — I50.30 UNSPECIFIED DIASTOLIC (CONGESTIVE) HEART FAILURE: ICD-10-CM

## 2020-01-05 LAB
ALBUMIN SERPL ELPH-MCNC: 4.1 G/DL — SIGNIFICANT CHANGE UP (ref 3.3–5)
ALP SERPL-CCNC: 72 U/L — SIGNIFICANT CHANGE UP (ref 40–120)
ALT FLD-CCNC: 35 U/L — SIGNIFICANT CHANGE UP (ref 4–41)
ANION GAP SERPL CALC-SCNC: 11 MMO/L — SIGNIFICANT CHANGE UP (ref 7–14)
APTT BLD: 25.1 SEC — LOW (ref 27.5–36.3)
AST SERPL-CCNC: 26 U/L — SIGNIFICANT CHANGE UP (ref 4–40)
BASE EXCESS BLDV CALC-SCNC: 0.1 MMOL/L — SIGNIFICANT CHANGE UP
BASOPHILS # BLD AUTO: 0.06 K/UL — SIGNIFICANT CHANGE UP (ref 0–0.2)
BASOPHILS NFR BLD AUTO: 0.4 % — SIGNIFICANT CHANGE UP (ref 0–2)
BILIRUB SERPL-MCNC: 1.1 MG/DL — SIGNIFICANT CHANGE UP (ref 0.2–1.2)
BLOOD GAS VENOUS - CREATININE: 0.88 MG/DL — SIGNIFICANT CHANGE UP (ref 0.5–1.3)
BLOOD GAS VENOUS - FIO2: 21 — SIGNIFICANT CHANGE UP
BUN SERPL-MCNC: 11 MG/DL — SIGNIFICANT CHANGE UP (ref 7–23)
CALCIUM SERPL-MCNC: 8.6 MG/DL — SIGNIFICANT CHANGE UP (ref 8.4–10.5)
CHLORIDE BLDV-SCNC: 112 MMOL/L — HIGH (ref 96–108)
CHLORIDE SERPL-SCNC: 109 MMOL/L — HIGH (ref 98–107)
CO2 SERPL-SCNC: 22 MMOL/L — SIGNIFICANT CHANGE UP (ref 22–31)
CREAT SERPL-MCNC: 0.94 MG/DL — SIGNIFICANT CHANGE UP (ref 0.5–1.3)
EOSINOPHIL # BLD AUTO: 0.07 K/UL — SIGNIFICANT CHANGE UP (ref 0–0.5)
EOSINOPHIL NFR BLD AUTO: 0.5 % — SIGNIFICANT CHANGE UP (ref 0–6)
GAS PNL BLDV: 139 MMOL/L — SIGNIFICANT CHANGE UP (ref 136–146)
GLUCOSE BLDV-MCNC: 127 MG/DL — HIGH (ref 70–99)
GLUCOSE SERPL-MCNC: 134 MG/DL — HIGH (ref 70–99)
HCO3 BLDV-SCNC: 22 MMOL/L — SIGNIFICANT CHANGE UP (ref 20–27)
HCT VFR BLD CALC: 38.6 % — LOW (ref 39–50)
HCT VFR BLDV CALC: 38.9 % — LOW (ref 39–51)
HGB BLD-MCNC: 12.7 G/DL — LOW (ref 13–17)
HGB BLDV-MCNC: 12.6 G/DL — LOW (ref 13–17)
IMM GRANULOCYTES NFR BLD AUTO: 0.3 % — SIGNIFICANT CHANGE UP (ref 0–1.5)
INR BLD: 1.08 — SIGNIFICANT CHANGE UP (ref 0.88–1.17)
LACTATE BLDV-MCNC: 2.2 MMOL/L — HIGH (ref 0.5–2)
LYMPHOCYTES # BLD AUTO: 1.13 K/UL — SIGNIFICANT CHANGE UP (ref 1–3.3)
LYMPHOCYTES # BLD AUTO: 7.8 % — LOW (ref 13–44)
MCHC RBC-ENTMCNC: 27.9 PG — SIGNIFICANT CHANGE UP (ref 27–34)
MCHC RBC-ENTMCNC: 32.9 % — SIGNIFICANT CHANGE UP (ref 32–36)
MCV RBC AUTO: 84.6 FL — SIGNIFICANT CHANGE UP (ref 80–100)
MONOCYTES # BLD AUTO: 0.77 K/UL — SIGNIFICANT CHANGE UP (ref 0–0.9)
MONOCYTES NFR BLD AUTO: 5.3 % — SIGNIFICANT CHANGE UP (ref 2–14)
NEUTROPHILS # BLD AUTO: 12.36 K/UL — HIGH (ref 1.8–7.4)
NEUTROPHILS NFR BLD AUTO: 85.7 % — HIGH (ref 43–77)
NRBC # FLD: 0 K/UL — SIGNIFICANT CHANGE UP (ref 0–0)
NT-PROBNP SERPL-SCNC: 69.21 PG/ML — SIGNIFICANT CHANGE UP
PCO2 BLDV: 49 MMHG — SIGNIFICANT CHANGE UP (ref 41–51)
PH BLDV: 7.33 PH — SIGNIFICANT CHANGE UP (ref 7.32–7.43)
PLATELET # BLD AUTO: 189 K/UL — SIGNIFICANT CHANGE UP (ref 150–400)
PMV BLD: 10.2 FL — SIGNIFICANT CHANGE UP (ref 7–13)
PO2 BLDV: < 24 MMHG — LOW (ref 35–40)
POTASSIUM BLDV-SCNC: 4.1 MMOL/L — SIGNIFICANT CHANGE UP (ref 3.4–4.5)
POTASSIUM SERPL-MCNC: 4.4 MMOL/L — SIGNIFICANT CHANGE UP (ref 3.5–5.3)
POTASSIUM SERPL-SCNC: 4.4 MMOL/L — SIGNIFICANT CHANGE UP (ref 3.5–5.3)
PROT SERPL-MCNC: 7 G/DL — SIGNIFICANT CHANGE UP (ref 6–8.3)
PROTHROM AB SERPL-ACNC: 12.3 SEC — SIGNIFICANT CHANGE UP (ref 9.8–13.1)
RBC # BLD: 4.56 M/UL — SIGNIFICANT CHANGE UP (ref 4.2–5.8)
RBC # FLD: 12.9 % — SIGNIFICANT CHANGE UP (ref 10.3–14.5)
SAO2 % BLDV: 18.7 % — LOW (ref 60–85)
SODIUM SERPL-SCNC: 142 MMOL/L — SIGNIFICANT CHANGE UP (ref 135–145)
TROPONIN T, HIGH SENSITIVITY: 8 NG/L — SIGNIFICANT CHANGE UP (ref ?–14)
TROPONIN T, HIGH SENSITIVITY: 9 NG/L — SIGNIFICANT CHANGE UP (ref ?–14)
WBC # BLD: 14.44 K/UL — HIGH (ref 3.8–10.5)
WBC # FLD AUTO: 14.44 K/UL — HIGH (ref 3.8–10.5)

## 2020-01-05 PROCEDURE — 71046 X-RAY EXAM CHEST 2 VIEWS: CPT | Mod: 26

## 2020-01-05 RX ORDER — SODIUM CHLORIDE 9 MG/ML
1000 INJECTION INTRAMUSCULAR; INTRAVENOUS; SUBCUTANEOUS
Refills: 0 | Status: DISCONTINUED | OUTPATIENT
Start: 2020-01-05 | End: 2020-01-07

## 2020-01-05 RX ORDER — ASPIRIN/CALCIUM CARB/MAGNESIUM 324 MG
81 TABLET ORAL DAILY
Refills: 0 | Status: DISCONTINUED | OUTPATIENT
Start: 2020-01-05 | End: 2020-01-07

## 2020-01-05 RX ORDER — ATORVASTATIN CALCIUM 80 MG/1
80 TABLET, FILM COATED ORAL AT BEDTIME
Refills: 0 | Status: DISCONTINUED | OUTPATIENT
Start: 2020-01-05 | End: 2020-01-07

## 2020-01-05 RX ORDER — ENOXAPARIN SODIUM 100 MG/ML
40 INJECTION SUBCUTANEOUS EVERY 24 HOURS
Refills: 0 | Status: DISCONTINUED | OUTPATIENT
Start: 2020-01-05 | End: 2020-01-07

## 2020-01-05 RX ORDER — METOPROLOL TARTRATE 50 MG
50 TABLET ORAL DAILY
Refills: 0 | Status: DISCONTINUED | OUTPATIENT
Start: 2020-01-06 | End: 2020-01-07

## 2020-01-05 RX ORDER — METOPROLOL TARTRATE 50 MG
25 TABLET ORAL
Qty: 0 | Refills: 0 | DISCHARGE

## 2020-01-05 RX ORDER — LOSARTAN POTASSIUM 100 MG/1
25 TABLET, FILM COATED ORAL DAILY
Refills: 0 | Status: DISCONTINUED | OUTPATIENT
Start: 2020-01-06 | End: 2020-01-07

## 2020-01-05 RX ORDER — NITROGLYCERIN 6.5 MG
1 CAPSULE, EXTENDED RELEASE ORAL
Qty: 0 | Refills: 0 | DISCHARGE

## 2020-01-05 RX ORDER — CLOPIDOGREL BISULFATE 75 MG/1
75 TABLET, FILM COATED ORAL DAILY
Refills: 0 | Status: DISCONTINUED | OUTPATIENT
Start: 2020-01-05 | End: 2020-01-07

## 2020-01-05 RX ORDER — LISINOPRIL 2.5 MG/1
1 TABLET ORAL
Qty: 0 | Refills: 0 | DISCHARGE

## 2020-01-05 RX ADMIN — ENOXAPARIN SODIUM 40 MILLIGRAM(S): 100 INJECTION SUBCUTANEOUS at 19:00

## 2020-01-05 RX ADMIN — Medication 81 MILLIGRAM(S): at 18:59

## 2020-01-05 RX ADMIN — ATORVASTATIN CALCIUM 80 MILLIGRAM(S): 80 TABLET, FILM COATED ORAL at 22:29

## 2020-01-05 RX ADMIN — SODIUM CHLORIDE 50 MILLILITER(S): 9 INJECTION INTRAMUSCULAR; INTRAVENOUS; SUBCUTANEOUS at 18:58

## 2020-01-05 RX ADMIN — CLOPIDOGREL BISULFATE 75 MILLIGRAM(S): 75 TABLET, FILM COATED ORAL at 18:59

## 2020-01-05 NOTE — H&P ADULT - ASSESSMENT
53 yo M PMhx HTN, MI, Cardiac Stents x4 (7/2019) s/p passed out at the gym admitted to tele for syncope and collapse, r/o ACS and Hypovolemia d/t dehydration.    +Syncope & Collapse  +Hypovolemia d/t Dehydration

## 2020-01-05 NOTE — H&P ADULT - PROBLEM SELECTOR PLAN 1
tele monitor   cardiac enzymes x 2: neg delta  Orthostatic blood pressure: NOw negative after 1.5L of IV NS by EMS.  encourage PO fluids intake.  c/w ASA, Plavix, Statin.  -TTE  Check UA

## 2020-01-05 NOTE — ED PROVIDER NOTE - NEUROLOGICAL, MLM
Alert and oriented, no focal deficits, no motor or sensory deficits. 5/5 strength all extremities, cranial nerves intact.

## 2020-01-05 NOTE — H&P ADULT - RS GEN PE MLT RESP DETAILS PC
no chest wall tenderness/no rhonchi/no rales/good air movement/clear to auscultation bilaterally/no wheezes/breath sounds equal/airway patent/respirations non-labored

## 2020-01-05 NOTE — ED PROVIDER NOTE - CLINICAL SUMMARY MEDICAL DECISION MAKING FREE TEXT BOX
Please call patient with normal lab
55 yo M, w/ PMH of CAD s/p stents x 4, presenting with syncopal episode while working out at gym. Arrives to ED hypotensive to 80s systolic. Concern for exertional syncope in setting of patient with previous STEMI, known CAD. Will obtain ekg, cxr, labs including cbc, coags, troponin, pro-bnp. Place on cardiac monitor. Reassess. May require admission.

## 2020-01-05 NOTE — ED ADULT TRIAGE NOTE - CHIEF COMPLAINT QUOTE
Pt was at the gym working out when he developed lightheadedness. Upon EMS arrival pt was pale, diaphoretic with nausea. Pt had a systolic BP in 60's. Pt remains hypotensive even with 1 and 1/2 fluid resuscitation. hx cardiac stent 6 months ago

## 2020-01-05 NOTE — H&P ADULT - NEGATIVE CARDIOVASCULAR SYMPTOMS
no dyspnea on exertion/no paroxysmal nocturnal dyspnea/no peripheral edema/no palpitations/no orthopnea/no chest pain

## 2020-01-05 NOTE — H&P ADULT - HISTORY OF PRESENT ILLNESS
55 yo M PMhx HTN, MI, Cardiac Stents x4 (7/2019) s/p passed out at the gym this morning. Pt was cardio work out at the gym this morning where he was cycling and running on treadmill. 50 mins into a workout pt developed dizziness, lightheadedness and felt like passing out. Pt sat down on the bench and felt everything getting black in his eyes and next thing he remember, he was on the floor with bunch of bystanders around him. He was given Gatorade to drink. Pt became nausea and vomited x1 clear color emesis. EMS arrived and took him to [facility] ED. On route to ED pt was found hypotensive with SBP 80's as pt reports and was given IV NS x1.5L total as he stated. His symptoms resolved after IV fluids hydration. Pt denies fever, chills, diaphoresis, chest pain, sob, palpitation, headache or abdominal pain.    Off note, pt reports he had a NORMAL Exercise Threadmill Stress Test in 12/2019.

## 2020-01-05 NOTE — ED PROVIDER NOTE - OBJECTIVE STATEMENT
53 yo M, w/ PMH of STEMI/CAD s/p 4 cardiac stents on aspirin, Plavix, statin, Metoprolol (most recent TTE shows EF of 55%, and Stage I Diastolic dysfunction), BIBEMS from gym following syncopal episode while working out. Patient denies any chest pain, shortness of breath, palpitations before or after the syncopal episode. States he currently feels in his normal state of health. Denies any complaints at this time. Denies recent illness or travel. States that his workout was a regular workout for him. States that he fell into a chair at the gym and denies hitting his head, and denies any headache, neck pain, vision change, or neurologic deficit since the fall. States that he had diaphoresis, nausea, and vomiting when he woke up from the syncopal episode. Denies any current complaints.    PMD: A physician in the [place]  Card: A physician in the [place] 55 yo M, w/ PMH of STEMI/CAD s/p 4 cardiac stents on aspirin, Plavix, statin, Metoprolol (most recent TTE shows EF of 55%, and Stage I Diastolic dysfunction), BIBEMS from gym following syncopal episode while working out. Patient denies any chest pain, shortness of breath, palpitations before or after the syncopal episode. States he currently feels in his normal state of health. Denies any complaints at this time. Denies recent illness or travel. States that his workout was a regular workout for him. States that he fell into a chair at the gym and denies hitting his head, and denies any headache, neck pain, vision change, or neurologic deficit since the fall. States that he had diaphoresis, nausea, and vomiting when he woke up from the syncopal episode. Denies any current complaints.    Attendinyo male presents with syncope while exercising just prior to arrival.  pt felt lightheaded after the event, had diaphoresis and vomited.  now feeling better.    PMD: A physician in the [place]  Card: A physician in the [place]

## 2020-01-05 NOTE — H&P ADULT - NSICDXPASTMEDICALHX_GEN_ALL_CORE_FT
PAST MEDICAL HISTORY:  Diastolic heart failure ~ State I    HTN (hypertension)     MI (myocardial infarction) with 4 stents placed

## 2020-01-05 NOTE — H&P ADULT - NEUROLOGICAL DETAILS
normal strength/alert and oriented x 3/responds to verbal commands/responds to pain/sensation intact/deep reflexes intact/cranial nerves intact

## 2020-01-05 NOTE — ED ADULT NURSE NOTE - OBJECTIVE STATEMENT
Pt presents to [facility], 54y M AOX4, ambulatory, PMH of 4 cardiac stents, HTN coming from gym to the ED for hypotension. Pt reports feeling dizziness and diaphoretic when he was at the gym. Pt states " I saw all black and I was on floor with people around me". Pt doesn't recall falling or experiencing head injuries. Pt states he had similar previous experience post-cardiac stents 2months ago.  Pt denies chest pain, dizziness, and sob at this time. Brother at bedside. IV left AC by EMS. Pt on cardiac monitor, satting at 100% RA. Brother at bedside. Labs drawn and sent. Awaiting further plan of care.

## 2020-01-06 LAB
ANION GAP SERPL CALC-SCNC: 11 MMO/L — SIGNIFICANT CHANGE UP (ref 7–14)
APPEARANCE UR: CLEAR — SIGNIFICANT CHANGE UP
BILIRUB UR-MCNC: NEGATIVE — SIGNIFICANT CHANGE UP
BLOOD UR QL VISUAL: NEGATIVE — SIGNIFICANT CHANGE UP
BUN SERPL-MCNC: 13 MG/DL — SIGNIFICANT CHANGE UP (ref 7–23)
CALCIUM SERPL-MCNC: 8.8 MG/DL — SIGNIFICANT CHANGE UP (ref 8.4–10.5)
CHLORIDE SERPL-SCNC: 107 MMOL/L — SIGNIFICANT CHANGE UP (ref 98–107)
CHOLEST SERPL-MCNC: 61 MG/DL — LOW (ref 120–199)
CO2 SERPL-SCNC: 24 MMOL/L — SIGNIFICANT CHANGE UP (ref 22–31)
COLOR SPEC: COLORLESS — SIGNIFICANT CHANGE UP
CREAT SERPL-MCNC: 0.94 MG/DL — SIGNIFICANT CHANGE UP (ref 0.5–1.3)
GLUCOSE SERPL-MCNC: 101 MG/DL — HIGH (ref 70–99)
GLUCOSE UR-MCNC: NEGATIVE — SIGNIFICANT CHANGE UP
HBA1C BLD-MCNC: 6.2 % — HIGH (ref 4–5.6)
HCT VFR BLD CALC: 38.1 % — LOW (ref 39–50)
HDLC SERPL-MCNC: 28 MG/DL — LOW (ref 35–55)
HGB BLD-MCNC: 12.3 G/DL — LOW (ref 13–17)
KETONES UR-MCNC: NEGATIVE — SIGNIFICANT CHANGE UP
LEUKOCYTE ESTERASE UR-ACNC: NEGATIVE — SIGNIFICANT CHANGE UP
LIPID PNL WITH DIRECT LDL SERPL: 26 MG/DL — SIGNIFICANT CHANGE UP
MAGNESIUM SERPL-MCNC: 1.9 MG/DL — SIGNIFICANT CHANGE UP (ref 1.6–2.6)
MCHC RBC-ENTMCNC: 27.5 PG — SIGNIFICANT CHANGE UP (ref 27–34)
MCHC RBC-ENTMCNC: 32.3 % — SIGNIFICANT CHANGE UP (ref 32–36)
MCV RBC AUTO: 85 FL — SIGNIFICANT CHANGE UP (ref 80–100)
NITRITE UR-MCNC: NEGATIVE — SIGNIFICANT CHANGE UP
NRBC # FLD: 0 K/UL — SIGNIFICANT CHANGE UP (ref 0–0)
PH UR: 7 — SIGNIFICANT CHANGE UP (ref 5–8)
PHOSPHATE SERPL-MCNC: 2.9 MG/DL — SIGNIFICANT CHANGE UP (ref 2.5–4.5)
PLATELET # BLD AUTO: 191 K/UL — SIGNIFICANT CHANGE UP (ref 150–400)
PMV BLD: 10.3 FL — SIGNIFICANT CHANGE UP (ref 7–13)
POTASSIUM SERPL-MCNC: 3.9 MMOL/L — SIGNIFICANT CHANGE UP (ref 3.5–5.3)
POTASSIUM SERPL-SCNC: 3.9 MMOL/L — SIGNIFICANT CHANGE UP (ref 3.5–5.3)
PROT UR-MCNC: NEGATIVE — SIGNIFICANT CHANGE UP
RBC # BLD: 4.48 M/UL — SIGNIFICANT CHANGE UP (ref 4.2–5.8)
RBC # FLD: 12.8 % — SIGNIFICANT CHANGE UP (ref 10.3–14.5)
SODIUM SERPL-SCNC: 142 MMOL/L — SIGNIFICANT CHANGE UP (ref 135–145)
SP GR SPEC: 1.01 — SIGNIFICANT CHANGE UP (ref 1–1.04)
TRIGL SERPL-MCNC: 69 MG/DL — SIGNIFICANT CHANGE UP (ref 10–149)
TSH SERPL-MCNC: 1.2 UIU/ML — SIGNIFICANT CHANGE UP (ref 0.27–4.2)
UROBILINOGEN FLD QL: NORMAL — SIGNIFICANT CHANGE UP
WBC # BLD: 7.5 K/UL — SIGNIFICANT CHANGE UP (ref 3.8–10.5)
WBC # FLD AUTO: 7.5 K/UL — SIGNIFICANT CHANGE UP (ref 3.8–10.5)

## 2020-01-06 PROCEDURE — 99232 SBSQ HOSP IP/OBS MODERATE 35: CPT

## 2020-01-06 RX ADMIN — ENOXAPARIN SODIUM 40 MILLIGRAM(S): 100 INJECTION SUBCUTANEOUS at 22:26

## 2020-01-06 RX ADMIN — ATORVASTATIN CALCIUM 80 MILLIGRAM(S): 80 TABLET, FILM COATED ORAL at 22:20

## 2020-01-06 RX ADMIN — CLOPIDOGREL BISULFATE 75 MILLIGRAM(S): 75 TABLET, FILM COATED ORAL at 12:35

## 2020-01-06 RX ADMIN — Medication 81 MILLIGRAM(S): at 12:34

## 2020-01-06 NOTE — CONSULT NOTE ADULT - ASSESSMENT
55 yo M with a PMH of HTN, s/p MI 07/2019 s/p stents x 4 with a reportedly normal NST 12/2019 who presented to the ED after having a syncopal episode during a gym session yesterday 1/5/2020.  The patient reports that he has been physically active since cardiac rehab last year and began going to the gym and weightlifting x 2 mos now.  Now s/p syncopal event after lifting approximately 60 lbs; the patient was able to sit down prior to his LOC.  This was a/w vomiting and symptoms improved with 1.5L of IVF.  ROS is significant for positional dizziness since 07/2019.  The patient does report an issue with relative hypotension during cardiac rehab in 2019 (SBPs 90s) and has been on a strict dietary regimen at times avoiding solid foods. Not overtly orthostatic per yesterdays measurements:    - Hb 6.2, mgmt per primary  - continue telemetry  - agree with repeat 2D TTE  - if LVS function still preserved, would dc Losartan  - keep K+ 4.0 - 4.5 & Mg 2.0 - 2.5  - discussed with EP attending, Dr. Stone 53 yo M with a PMH of HTN, s/p MI 07/2019 s/p stents x 4 with a reportedly normal NST 12/2019 who presented to the ED after having a syncopal episode during a gym session yesterday 1/5/2020.  The patient reports that he has been physically active since cardiac rehab last year and began going to the gym and weightlifting x 2 mos now.  Now s/p syncopal event after lifting approximately 60 lbs; the patient was able to sit down prior to his LOC.  This was a/w vomiting and symptoms improved with 1.5L of IVF.  ROS is significant for positional dizziness since 07/2019.  The patient does report an issue with relative hypotension during cardiac rehab in 2019 (SBPs 90s) and has been on a strict dietary regimen at times avoiding solid foods. Not overtly orthostatic per yesterdays measurements:    #Exertional syncope likely in the setting of neg chronotropic effects of Toprol XL and hypotension  - HbA1c 6.2, mgmt per primary  - continue telemetry  - agree with repeat 2D TTE  - if LVS function still preserved, would dc Losartan  - may require lower doses of Toprol XL  - keep K+ 4.0 - 4.5 & Mg 2.0 - 2.5  - further recommendations to follow  - discussed with EP attending, Dr. Stone 55 yo M with a PMH of HTN, s/p MI 07/2019 s/p stents x 4 with a reportedly normal NST 12/2019 who presented to the ED after having a syncopal episode during a gym session yesterday 1/5/2020.  The patient reports that he has been physically active since cardiac rehab last year and began going to the gym and weightlifting x 2 mos now.  Now s/p syncopal event after lifting approximately 60 lbs; the patient was able to sit down prior to his LOC.  This was a/w vomiting and symptoms improved with 1.5L of IVF.  ROS is significant for positional dizziness since 07/2019.  The patient does report an issue with relative hypotension during cardiac rehab in 2019 (SBPs 90s) and has been on a strict dietary regimen at times avoiding solid foods. Not overtly orthostatic per yesterdays measurements:    #Exertional syncope likely in the setting of neg chronotropic effects of Toprol XL and hypotension  - can consider B/L Carotid artery US  - continue telemetry  - agree with repeat 2D TTE  - if LVS function still preserved, would dc Losartan  - may require lower doses of Toprol XL  - keep K+ 4.0 - 4.5 & Mg 2.0 - 2.5  - HbA1c 6.2, mgmt per primary  - further recommendations to follow  - if work up neg, may consider DEBBY  - discussed with EP attending, Dr. Stone 55 yo M with a PMH of HTN, s/p MI 07/2019 s/p stents x 4 with a reportedly normal NST 12/2019 who presented to the ED after having a syncopal episode during a gym session yesterday 1/5/2020.  The patient reports that he has been physically active since cardiac rehab last year and began going to the gym and weightlifting x 2 mos now.  Now s/p syncopal event after lifting approximately 60 lbs; the patient was able to sit down prior to his LOC.  This was a/w vomiting and symptoms improved with 1.5L of IVF for SBP in the 80s.  ROS is significant for positional dizziness since 07/2019.  The patient does report an issue with relative hypotension during cardiac rehab in 2019 (SBPs 90s) and has been on a strict dietary regimen at times avoiding solid foods. Not overtly orthostatic per yesterdays measurements:    #Neurocardiogenic syncope on exertion likely in the setting of neg chronotropic effects of Toprol XL and hypotension  - can consider B/L Carotid artery US  - continue telemetry  - agree with repeat 2D TTE  - if LVS function still preserved, would dc Losartan  - may require lower doses of Toprol XL  - keep K+ 4.0 - 4.5 & Mg 2.0 - 2.5  - HbA1c 6.2, mgmt per primary  - further recommendations to follow  - if work up neg, may consider DEBBY vs ILR  - discussed with EP attending, Dr. Stone 53 yo M with a PMH of HTN, s/p MI 07/2019 s/p stents x 4 with a reportedly normal NST 12/2019 who presented to the ED after having a syncopal episode during a gym session yesterday 1/5/2020.  The patient reports that he has been physically active since cardiac rehab last year and began going to the gym and weightlifting x 2 mos now.  Now s/p syncopal event after lifting approximately 60 lbs; the patient was able to sit down prior to his LOC.  This was a/w vomiting and symptoms improved with 1.5L of IVF for SBP in the 80s.  ROS is significant for positional dizziness since 07/2019.  The patient does report an issue with relative hypotension during cardiac rehab in 2019 (SBPs 90s) and has been on a strict dietary regimen at times avoiding solid foods. Not overtly orthostatic per yesterdays measurements:    #Neurocardiogenic syncope on exertion likely in the setting of neg chronotropic effects of Toprol XL and hypotension  - can consider B/L Carotid artery US  - continue telemetry  - agree with repeat 2D TTE  - if LVS function still preserved, would dc Losartan  - may require lower doses of Toprol XL  - keep K+ 4.0 - 4.5 & Mg 2.0 - 2.5  - HbA1c 6.2, mgmt per primary  - further recommendations to follow  - discussed with EP attending, Dr. Stone

## 2020-01-06 NOTE — PROGRESS NOTE ADULT - SUBJECTIVE AND OBJECTIVE BOX
[facility]    Subjective: Patient seen and examined. No new events except as noted.     REVIEW OF SYSTEMS:    CONSTITUTIONAL: No weakness, fevers or chills  EYES/ENT: No visual changes;  No vertigo or throat pain   NECK: No pain or stiffness  RESPIRATORY: No cough, wheezing, hemoptysis; No shortness of breath  CARDIOVASCULAR: No chest pain or palpitations  GASTROINTESTINAL: No abdominal or epigastric pain. No nausea, vomiting, or hematemesis; No diarrhea or constipation. No melena or hematochezia.  GENITOURINARY: No dysuria, frequency or hematuria  NEUROLOGICAL: No numbness or weakness  SKIN: No itching, burning, rashes, or lesions   All other review of systems is negative unless indicated above.    MEDICATIONS:  MEDICATIONS  (STANDING):  aspirin enteric coated 81 milliGRAM(s) Oral daily  atorvastatin 80 milliGRAM(s) Oral at bedtime  clopidogrel Tablet 75 milliGRAM(s) Oral daily  enoxaparin Injectable 40 milliGRAM(s) SubCutaneous every 24 hours  losartan 25 milliGRAM(s) Oral daily  metoprolol succinate ER 50 milliGRAM(s) Oral daily  sodium chloride 0.9%. 1000 milliLiter(s) (50 mL/Hr) IV Continuous <Continuous>      PHYSICAL EXAM:  T(C): 37.1 (20 @ 11:03), Max: 37.1 (20 @ 11:03)  HR: 65 (20 @ 11:03) (59 - 65)  BP: 109/75 (20 @ 11:03) (99/56 - 116/76)  RR: 17 (20 @ 11:03) (17 - 18)  SpO2: 100% (20 @ 11:03) (100% - 100%)  Wt(kg): --  I&O's Summary        Appearance: Normal	  HEENT:   Normal oral mucosa, PERRL, EOMI	  Lymphatic: No lymphadenopathy , no edema  Cardiovascular: Normal S1 S2, No JVD, No murmurs , Peripheral pulses palpable 2+ bilaterally  Respiratory: Lungs clear to auscultation, normal effort 	  Gastrointestinal:  Soft, Non-tender, + BS	  Skin: No rashes, No ecchymoses, No cyanosis, warm to touch  Musculoskeletal: Normal range of motion, normal strength  Psychiatry:  Mood & affect appropriate  Ext: No edema      All labs, Imaging and EKGs personally reviewed                           12.3   7.50  )-----------( 191      ( 2020 04:30 )             38.1               01-06    142  |  107  |  13  ----------------------------<  101<H>  3.9   |  24  |  0.94    Ca    8.8      2020 04:30  Phos  2.9     -06  Mg     1.9     -06    TPro  7.0  /  Alb  4.1  /  TBili  1.1  /  DBili  x   /  AST  26  /  ALT  35  /  AlkPhos  72  01-05    PT/INR - ( 2020 12:00 )   PT: 12.3 SEC;   INR: 1.08          PTT - ( 2020 12:00 )  PTT:25.1 SEC                   Urinalysis Basic - ( 2020 11:40 )    Color: COLORLESS / Appearance: CLEAR / S.008 / pH: 7.0  Gluc: NEGATIVE / Ketone: NEGATIVE  / Bili: NEGATIVE / Urobili: NORMAL   Blood: NEGATIVE / Protein: NEGATIVE / Nitrite: NEGATIVE   Leuk Esterase: NEGATIVE / RBC: x / WBC x   Sq Epi: x / Non Sq Epi: x / Bacteria: x

## 2020-01-06 NOTE — PROGRESS NOTE ADULT - ATTENDING COMMENTS
Advanced care planning was discussed with patient and family.  Advanced care planning forms were reviewed and discussed.  Differential diagnosis and plan of care discussed with patient after the evaluation.   Pain assessed and judicious use of narcotics when appropriate was discussed.  Importance of Fall prevention discussed.  Counseling on Smoking and Alcohol cessation was offered when appropriate.  Counseling on Diet, exercise, and medication compliance was done.     Virginia Coleman DO   Internal Medicine  [address] [place] [address] [id number]  Office: [phone]  Pager: [phone]

## 2020-01-06 NOTE — PROGRESS NOTE ADULT - ASSESSMENT
55 yo M PMhx HTN, MI, Cardiac Stents x4 (7/2019) s/p passed out at the gym admitted to tele for syncope and collapse, r/o ACS and Hypovolemia d/t dehydration.

## 2020-01-06 NOTE — PHYSICAL THERAPY INITIAL EVALUATION ADULT - GENERAL OBSERVATIONS, REHAB EVAL
Consult received, chart reviewed. Patient received supine in bed, no apparent distress, +tele. Patient agreed to Evaluation from Physical Therapist.

## 2020-01-06 NOTE — PHYSICAL THERAPY INITIAL EVALUATION ADULT - ADDITIONAL COMMENTS
Pt. was left seated at edge of bed post PT Evaluation, no apparent distress, all lines intact. RN aware of pt. status and participation in PT.

## 2020-01-06 NOTE — CONSULT NOTE ADULT - ATTENDING COMMENTS
53 yo M with a PMH of HTN, s/p MI 07/2019 s/p stents x 4 with a reportedly normal NST 12/2019 who presented to the ED after having a syncopal episode during a gym session yesterday 1/5/2020.  The patient reports that he has been physically active since cardiac rehab last year and began going to the gym and weightlifting x 2 mos now.  Now s/p syncopal event after lifting approximately 60 lbs; the patient was able to sit down prior to his LOC. Given symptoms of nauses and vomiting- likely vasovagal. Will adjust BP meds. Follow on tele. No bradyarrhythmias or symptoms.

## 2020-01-06 NOTE — CONSULT NOTE ADULT - SUBJECTIVE AND OBJECTIVE BOX
Patient is a 54y old  Male who presents with a chief complaint of syncope (06 Jan 2020 09:40)      HPI:    53 yo M PMhx HTN, MI, Cardiac Stents x4 (7/2019) s/p passed out at the gym this morning. Pt was cardio work out at the gym this morning where he was cycling and running on treadmill. 50 mins into a workout pt developed dizziness, lightheadedness and felt like passing out. Pt sat down on the bench and felt everything getting black in his eyes and next thing he remember, he was on the floor with bunch of bystanders around him. He was given Gatorade to drink. Pt became nausea and vomited x1 clear color emesis. EMS arrived and took him to [facility] ED. On route to ED pt was found hypotensive with SBP 80's as pt reports and was given IV NS x1.5L total as he stated. His symptoms resolved after IV fluids hydration. Pt denies fever, chills, diaphoresis, chest pain, sob, palpitation, headache or abdominal pain.    Of note, pt reports he had a NORMAL Exercise Threadmill Stress Test in 12/2019. (05 Jan 2020 17:44)    Pt has been afebrile, denies cough/congestion/cp/abd pain/dysuria/diarrhea/rash.  No recent travel or sick contacts.  Initial WBC 14 --> 7.5.  ID consult called to r/o acute infectious process.        REVIEW OF SYSTEMS:    CONSTITUTIONAL: No fever, weight loss, or fatigue  EYES: No eye pain, visual disturbances, or discharge  ENMT:  No sore throat  NECK: No pain or stiffness  RESPIRATORY: No cough, wheezing, chills or hemoptysis; No shortness of breath  CARDIOVASCULAR: No chest pain, palpitations, dizziness, or leg swelling  GASTROINTESTINAL: No abdominal or epigastric pain. No nausea, vomiting, or hematemesis; No diarrhea or constipation. No melena or hematochezia.  GENITOURINARY: No dysuria, frequency, hematuria, or incontinence  NEUROLOGICAL: No headaches, memory loss, loss of strength, numbness, or tremors  SKIN: No itching, burning, rashes, or lesions   LYMPH NODES: No enlarged glands  MUSCULOSKELETAL: No joint pain or swelling; No muscle, back, or extremity pain      PAST MEDICAL & SURGICAL HISTORY:  Diastolic heart failure: ~ State I  MI (myocardial infarction): with 4 stents placed  HTN (hypertension)  Stented coronary artery: ~ earlier 07/2019      Allergies    Benadryl (Unknown)    Intolerances        FAMILY HISTORY:  Family history of MI (myocardial infarction)  Family history of premature CAD      SOCIAL HISTORY:    No smoking, ivdu, etoh    MEDICATIONS  (STANDING):  aspirin enteric coated 81 milliGRAM(s) Oral daily  atorvastatin 80 milliGRAM(s) Oral at bedtime  clopidogrel Tablet 75 milliGRAM(s) Oral daily  enoxaparin Injectable 40 milliGRAM(s) SubCutaneous every 24 hours  losartan 25 milliGRAM(s) Oral daily  metoprolol succinate ER 50 milliGRAM(s) Oral daily  sodium chloride 0.9%. 1000 milliLiter(s) (50 mL/Hr) IV Continuous <Continuous>    MEDICATIONS  (PRN):      Vital Signs Last 24 Hrs  T(C): 37.1 (06 Jan 2020 11:03), Max: 37.1 (06 Jan 2020 11:03)  T(F): 98.7 (06 Jan 2020 11:03), Max: 98.7 (06 Jan 2020 11:03)  HR: 65 (06 Jan 2020 11:03) (59 - 76)  BP: 109/75 (06 Jan 2020 11:03) (99/56 - 116/76)  BP(mean): 85 (06 Jan 2020 11:03) (85 - 85)  RR: 17 (06 Jan 2020 11:03) (14 - 18)  SpO2: 100% (06 Jan 2020 11:03) (100% - 100%)    PHYSICAL EXAM:    GENERAL: NAD, well-groomed  HEAD:  Atraumatic, Normocephalic  EYES: EOMI, PERRLA, conjunctiva and sclera clear  ENMT: No tonsillar erythema, exudates, or enlargement; Moist mucous membranes  NECK: Supple, No JVD  CHEST/LUNG: Clear to percussion bilaterally; No rales, rhonchi, wheezing, or rubs  HEART: Regular rate and rhythm; No murmurs, rubs, or gallops  ABDOMEN: Soft, Nontender, Nondistended; Bowel sounds present  EXTREMITIES:  2+ Peripheral Pulses, No clubbing, cyanosis, or edema  LYMPH: No lymphadenopathy noted  SKIN: No rashes or lesions    LABS:  CBC Full  -  ( 06 Jan 2020 04:30 )  WBC Count : 7.50 K/uL  RBC Count : 4.48 M/uL  Hemoglobin : 12.3 g/dL  Hematocrit : 38.1 %  Platelet Count - Automated : 191 K/uL  Mean Cell Volume : 85.0 fL  Mean Cell Hemoglobin : 27.5 pg  Mean Cell Hemoglobin Concentration : 32.3 %  Auto Neutrophil # : x  Auto Lymphocyte # : x  Auto Monocyte # : x  Auto Eosinophil # : x  Auto Basophil # : x  Auto Neutrophil % : x  Auto Lymphocyte % : x  Auto Monocyte % : x  Auto Eosinophil % : x  Auto Basophil % : x      01-06    142  |  107  |  13  ----------------------------<  101<H>  3.9   |  24  |  0.94    Ca    8.8      06 Jan 2020 04:30    TPro  7.0  /  Alb  4.1  /  TBili  1.1  /  DBili  x   /  AST  26  /  ALT  35  /  AlkPhos  72  01-05      LIVER FUNCTIONS - ( 05 Jan 2020 12:00 )  Alb: 4.1 g/dL / Pro: 7.0 g/dL / ALK PHOS: 72 u/L / ALT: 35 u/L / AST: 26 u/L / GGT: x                               MICROBIOLOGY:                    RADIOLOGY:

## 2020-01-06 NOTE — CONSULT NOTE ADULT - ASSESSMENT
53 yo M PMhx HTN, MI, Cardiac Stents x4 (7/2019) s/p passed out at the gym this morning. Pt was cardio work out at the gym this morning where he was cycling and running on treadmill. 50 mins into a workout pt developed dizziness, lightheadedness and felt like passing out. Pt sat down on the bench and felt everything getting black in his eyes and next thing he remember, he was on the floor with bunch of bystanders around him. He was given Gatorade to drink. Pt became nausea and vomited x1 clear color emesis. EMS arrived and took him to [facility] ED. On route to ED pt was found hypotensive with SBP 80's as pt reports and was given IV NS x1.5L total as he stated. His symptoms resolved after IV fluids hydration. Pt denies fever, chills, diaphoresis, chest pain, sob, palpitation, headache or abdominal pain.    Of note, pt reports he had a NORMAL Exercise Threadmill Stress Test in 12/2019. (05 Jan 2020 17:44)    Pt has been afebrile, denies cough/congestion/cp/abd pain/dysuria/diarrhea/rash.  No recent travel or sick contacts.  Initial WBC 14 --> 7.5.  ID consult called to r/o acute infectious process    Leukocytosis:    - Likely reactive process.  Currently afebrile, wbc normalized today.  No localizing symptoms on clinical exam.  Pt without sepsis presentation.    - No indication for abx at this time.  If pt spikes fever, recommend further w/u (blood cx x 2, UA, ucx, rvp)      Lyssa Green  [phone]

## 2020-01-06 NOTE — PROGRESS NOTE ADULT - SUBJECTIVE AND OBJECTIVE BOX
Patient is a 54y old  Male who presents with a chief complaint of syncope (06 Jan 2020 11:36)      INTERVAL HISTORY: feels better     	  MEDICATIONS:  losartan 25 milliGRAM(s) Oral daily  metoprolol succinate ER 50 milliGRAM(s) Oral daily        PHYSICAL EXAM:  T(C): 37.1 (01-06-20 @ 11:03), Max: 37.1 (01-06-20 @ 11:03)  HR: 65 (01-06-20 @ 11:03) (59 - 66)  BP: 109/75 (01-06-20 @ 11:03) (99/56 - 116/76)  RR: 17 (01-06-20 @ 11:03) (17 - 18)  SpO2: 100% (01-06-20 @ 11:03) (100% - 100%)  Wt(kg): --  I&O's Summary    Height (cm): 172.72 (01-05 @ 17:44)  Weight (kg): 72.6 (01-05 @ 17:44)  BMI (kg/m2): 24.3 (01-05 @ 17:44)  BSA (m2): 1.86 (01-05 @ 17:44)    Appearance: In no distress	  HEENT:    PERRL, EOMI	  Cardiovascular:  S1 S2, No JVD  Respiratory: Lungs clear to auscultation	  Gastrointestinal:  Soft, Non-tender, + BS	  Extremities:  No edema of LE                                12.3   7.50  )-----------( 191      ( 06 Jan 2020 04:30 )             38.1     01-06    142  |  107  |  13  ----------------------------<  101<H>  3.9   |  24  |  0.94    Ca    8.8      06 Jan 2020 04:30  Phos  2.9     01-06  Mg     1.9     01-06    TPro  7.0  /  Alb  4.1  /  TBili  1.1  /  DBili  x   /  AST  26  /  ALT  35  /  AlkPhos  72  01-05        Labs personally reviewed      Assessment and Plan:   Assessment:  · Assessment		    53 yo M PMhx HTN, MI, Cardiac Stents x4 (7/2019) s/p passed out at the gym admitted to tele for syncope and collapse, r/o ACS and Hypovolemia d/t dehydration.    +Syncope & Collapse  +Hypovolemia d/t Dehydration      Problem/Plan - 1:  ·  Problem: Syncope and collapse.  Plan: tele monitor   cardiac enzymes x 2: neg delta  Orthostatic blood pressure: negative  encourage PO fluids intake.  c/w ASA, Plavix, Statin.  -TTE  Given exertional syncope will get EP to eval as well    Problem/Plan - 2:  ·  Problem: Hypovolemia due to dehydration.  Plan: 1.5L of IV NS by EMS.  encourage PO fluids intake.     Problem/Plan - 3:  ·  Problem: HTN (hypertension).  Plan: Monitor BP daily  DASH diet  Resume home meds as BP tolerates.     Problem/Plan - 4:  ·  Problem: Diastolic heart failure.  Plan: Pt currently euvolemic.  DASH diet 2g sodium  c/w ARB and Toprol.     Problem/Plan - 5:  ·  Problem: CAD (coronary artery disease).  Plan: c/w ASA, Plavix, Statin.     Problem/Plan - 6:  Problem: Need for prophylactic measure. Plan: Lovenox 40mg SC Q24h.          Felix Montano DO [facility]  Cardiovascular Medicine  [address] [place] [address], [address]  Office: [phone]  Cell: [phone]

## 2020-01-06 NOTE — CONSULT NOTE ADULT - SUBJECTIVE AND OBJECTIVE BOX
HPI/CC:  Asked to see this patient for recent syncope.  This is a 55 yo M with a PMH of HTN, s/p MI 07/2019 s/p stents x 4 with a reportedly normal NST 12/2019 who presented to the ED after having a syncopal episode during a gym session yesterday 1/5/2020.  The patient reports that he has been physically active since cardiac rehab last year and began going to the gym and weightlifting x 2 mos now.  Yesterday, he states that as he was lifting approximately 60 lbs he started to see "black" and felt dizzy; sat down on a bench then awoke to people surrounding him.  Was given something to drink but immediately had an episode of emesis.  Reports feeling better after being treated with 1.5L of IVF en route to the ED.  ROS is significant for positional dizziness "when picking something up off the floor"; this has occurred since he was placed on Toprol XL.  ROS is otherwise negative for previous syncopal episodes, chest pain, chills, fever, or palpitations.  Telemetry is significant for sinus bradycardia 50s with asymptomatic sinus bradycardia in the 40s during sleep. Has been on a strict dietary regimen at times avoiding solid foods    PAST MEDICAL & SURGICAL HISTORY:  Diastolic heart failure: ~ Stage I  MI (myocardial infarction): with 4 stents placed  HTN (hypertension)  Stented coronary artery: ~ earlier 07/2019  	  FAMILY HISTORY:  Family history of MI (myocardial infarction)  Family history of premature CAD    DRUG ALLERGIES:  Benadryl (Unknown)    MEDICATIONS:  aspirin enteric coated 81 milliGRAM(s) Oral daily  clopidogrel Tablet 75 milliGRAM(s) Oral daily  enoxaparin Injectable 40 milliGRAM(s) SubCutaneous every 24 hours  losartan 25 milliGRAM(s) Oral daily  metoprolol succinate ER 50 milliGRAM(s) Oral daily    SOCIAL HISTORY:    Denies illicit drug use or tobacco use  Denies alcohol 	    REVIEW OF SYSTEMS:  CONSTITUTIONAL: No fever, weight loss, or fatigue  EYES: No eye pain, visual disturbances, or discharge  ENMT:  No difficulty hearing, tinnitus, vertigo; No sinus or throat pain  NECK: No pain or stiffness  RESPIRATORY: No cough, wheezing, chills or hemoptysis; No Shortness of Breath  CARDIOVASCULAR: No chest pain, palpitations, passing out, dizziness, or leg swelling  GASTROINTESTINAL: No abdominal or epigastric pain. No nausea, vomiting, or hematemesis; No diarrhea or constipation. No melena or hematochezia.  GENITOURINARY: No dysuria, frequency, hematuria, or incontinence  NEUROLOGICAL: No headaches, memory loss, loss of strength, numbness, or tremors  SKIN: No itching, burning, rashes, or lesions   LYMPH Nodes: No enlarged glands  ENDOCRINE: No heat or cold intolerance; No hair loss  MUSCULOSKELETAL: No joint pain or swelling; No muscle, back, or extremity pain  PSYCHIATRIC: No depression, anxiety, mood swings, or difficulty sleeping  HEME/LYMPH: No easy bruising, or bleeding gums  ALLERGY AND IMMUNOLOGIC: No hives or eczema	  All others negative	    PHYSICAL EXAM:  T(C): 36.4 (01-06-20 @ 06:20), Max: 36.7 (01-06-20 @ 01:00)  HR: 61 (01-06-20 @ 06:20) (59 - 76)  BP: 116/76 (01-06-20 @ 06:20) (86/61 - 116/76)  RR: 17 (01-06-20 @ 06:20) (14 - 18)  SpO2: 100% (01-06-20 @ 06:20) (100% - 100%)  Wt(kg): --  I&O's Summary    Appearance: Normal	  HEENT:   Normal oral mucosa, PERRL, EOMI	  Lymphatic: No lymphadenopathy  Cardiovascular: Normal S1 S2, No JVD, No murmurs, No edema  Chest: clear  Respiratory: Lungs clear to auscultation	  Psychiatry: A & O x 3, Mood & affect appropriate  Gastrointestinal:  Soft, Non-tender, + BS	  Skin: No rashes, No ecchymoses, No cyanosis	  Neurologic: Non-focal  Extremities: Normal range of motion, No clubbing, cyanosis or edema  Vascular: Peripheral pulses palpable 2+ bilaterally    DIAGNOSTICS:  TELEMETRY: as above  12 Lead ECG:   < from: 12 Lead ECG (01.05.20 @ 11:20) >  Ventricular Rate 65 BPM    Atrial Rate 65 BPM    P-R Interval 138 ms    QRS Duration 84 ms    Q-T Interval 456 ms    QTC Calculation(Bezet) 474 ms    P Axis 80 degrees    R Axis 59 degrees    T Axis 49 degrees    Diagnosis Line Normal sinus rhythm  Normal ECG    < end of copied text >    CATH: N/A    2D TTE:   < from: Transthoracic Echocardiogram (07.10.19 @ 17:55) >  Patient name: ERIN CARDENAS  YOB: 1965   Age: 54 (M)   MR#: [id number]  Study Date: 7/10/2019  Location: O/PSonographer: Ciprianograham Martínez  Study quality: Technically Fair  Referring Physician: Not Available Doctor, MD  Blood Pressure: 116/72 mmHg  Height: 173 cm  Weight: 82 kg  BSA: 2 m2  Heart Rate: 85 mmHg  ------------------------------------------------------------------------  PROCEDURE: Transthoracic echocardiogram with 2-D, M-Mode  and complete spectral and color flow Doppler.  INDICATION: Chronic ischemic heart disease, unspecified  (I25.9)  ------------------------------------------------------------------------  DIMENSIONS:  Dimensions:     Normal Values:  LA:     3.3 cm    2.0 - 4.0 cm  Ao:     3.3 cm    2.0 - 3.8 cm  SEPTUM: 1.1 cm    0.6 - 1.2 cm  PWT:    1.0 cm    0.6 - 1.1 cm  LVIDd:  4.7 cm    3.0 - 5.6 cm  LVIDs:  3.1 cm    1.8 - 4.0 cm  Derived Variables:  LVMI: 90 g/m2  RWT: 0.42  Fractional short: 34 %  Ejection Fraction (Visual Estimate): 55 %  Peak Velocity (m/sec): AoV=1.4    < end of copied text >  < from: Transthoracic Echocardiogram (07.10.19 @ 17:55) >  CONCLUSIONS:  1. Normal mitral valve. Minimal mitral regurgitation.  2. Normal trileaflet aortic valve. No aortic valve  regurgitation seen.  3. Overall preserved left ventricular systolic function.  The basal inferior wall, the basal anterolateral wall, the  mid inferior wall, and the mid inferoseptum are mildly  hypokinetic.  4. Mild diastolic dysfunction (Stage I).  5. Normal right ventricular size and function.  6. Normal pericardium with no pericardial effusion.  *** No previous Echo exam.  ------------------------------------------------------------------------  Confirmed on  7/10/2019 - 19:02:58 by Regis Alvarez M.D.    < end of copied text >    LUIS: N/A	  RADIOLOGY: N/A    OTHER: N/A    LABS:	 	                          12.3   7.50  )-----------( 191      ( 06 Jan 2020 04:30 )             38.1     01-06    142  |  107  |  13  ----------------------------<  101<H>  3.9   |  24  |  0.94    Ca    8.8      06 Jan 2020 04:30    TPro  7.0  /  Alb  4.1  /  TBili  1.1  /  DBili  x   /  AST  26  /  ALT  35  /  AlkPhos  72  01-05    proBNP: N/A HPI/CC:  Asked to see this patient for recent syncope.  This is a 53 yo M with a PMH of HTN, s/p MI 07/2019 s/p stents x 4 with a reportedly normal NST 12/2019 who presented to the ED after having a syncopal episode during a gym session yesterday 1/5/2020.  The patient reports that he has been physically active since cardiac rehab last year and began going to the gym and weightlifting x 2 mos now.  Yesterday, he states that as he was lifting approximately 60 lbs he started to see "black" and felt dizzy; sat down on a bench then awoke to people surrounding him.  Was given something to drink but immediately had an episode of emesis.  Reports feeling better after being treated with 1.5L of IVF for SBP in the 80s while en route to the ED.  ROS is significant for positional dizziness "when picking something up off the floor"; this has occurred since he was placed on Toprol XL.  ROS is otherwise negative for previous syncopal episodes, chest pain, chills, fever, or palpitations.  Telemetry is significant for sinus bradycardia 50s with asymptomatic sinus bradycardia in the 40s during sleep. Has been on a strict dietary regimen at times avoiding solid foods    PAST MEDICAL & SURGICAL HISTORY:  Diastolic heart failure: ~ Stage I  MI (myocardial infarction): with 4 stents placed  HTN (hypertension)  Stented coronary artery: ~ earlier 07/2019  	  FAMILY HISTORY:  Family history of MI (myocardial infarction)  Family history of premature CAD    DRUG ALLERGIES:  Benadryl (Unknown)    MEDICATIONS:  aspirin enteric coated 81 milliGRAM(s) Oral daily  clopidogrel Tablet 75 milliGRAM(s) Oral daily  enoxaparin Injectable 40 milliGRAM(s) SubCutaneous every 24 hours  losartan 25 milliGRAM(s) Oral daily  metoprolol succinate ER 50 milliGRAM(s) Oral daily    SOCIAL HISTORY:    Denies illicit drug use or tobacco use  Denies alcohol 	    REVIEW OF SYSTEMS:  CONSTITUTIONAL: No fever, weight loss, or fatigue  EYES: No eye pain, visual disturbances, or discharge  ENMT:  No difficulty hearing, tinnitus, vertigo; No sinus or throat pain  NECK: No pain or stiffness  RESPIRATORY: No cough, wheezing, chills or hemoptysis; No Shortness of Breath  CARDIOVASCULAR: No chest pain, palpitations, passing out, dizziness, or leg swelling  GASTROINTESTINAL: No abdominal or epigastric pain. No nausea, vomiting, or hematemesis; No diarrhea or constipation. No melena or hematochezia.  GENITOURINARY: No dysuria, frequency, hematuria, or incontinence  NEUROLOGICAL: No headaches, memory loss, loss of strength, numbness, or tremors  SKIN: No itching, burning, rashes, or lesions   LYMPH Nodes: No enlarged glands  ENDOCRINE: No heat or cold intolerance; No hair loss  MUSCULOSKELETAL: No joint pain or swelling; No muscle, back, or extremity pain  PSYCHIATRIC: No depression, anxiety, mood swings, or difficulty sleeping  HEME/LYMPH: No easy bruising, or bleeding gums  ALLERGY AND IMMUNOLOGIC: No hives or eczema	  All others negative	    PHYSICAL EXAM:  T(C): 36.4 (01-06-20 @ 06:20), Max: 36.7 (01-06-20 @ 01:00)  HR: 61 (01-06-20 @ 06:20) (59 - 76)  BP: 116/76 (01-06-20 @ 06:20) (86/61 - 116/76)  RR: 17 (01-06-20 @ 06:20) (14 - 18)  SpO2: 100% (01-06-20 @ 06:20) (100% - 100%)  Wt(kg): --  I&O's Summary    Appearance: Normal	  HEENT:   Normal oral mucosa, PERRL, EOMI	  Lymphatic: No lymphadenopathy  Cardiovascular: Normal S1 S2, No JVD, No murmurs, No edema  Chest: clear  Respiratory: Lungs clear to auscultation	  Psychiatry: A & O x 3, Mood & affect appropriate  Gastrointestinal:  Soft, Non-tender, + BS	  Skin: No rashes, No ecchymoses, No cyanosis	  Neurologic: Non-focal  Extremities: Normal range of motion, No clubbing, cyanosis or edema  Vascular: Peripheral pulses palpable 2+ bilaterally    DIAGNOSTICS:  TELEMETRY: as above  12 Lead ECG:   < from: 12 Lead ECG (01.05.20 @ 11:20) >  Ventricular Rate 65 BPM    Atrial Rate 65 BPM    P-R Interval 138 ms    QRS Duration 84 ms    Q-T Interval 456 ms    QTC Calculation(Bezet) 474 ms    P Axis 80 degrees    R Axis 59 degrees    T Axis 49 degrees    Diagnosis Line Normal sinus rhythm  Normal ECG    < end of copied text >    CATH: N/A    2D TTE:   < from: Transthoracic Echocardiogram (07.10.19 @ 17:55) >  Patient name: ERIN CARDENAS  YOB: 1965   Age: 54 (M)   MR#: [id number]  Study Date: 7/10/2019  Location: O/PSonographer: Cipriano Martínez  Study quality: Technically Fair  Referring Physician: Not Available Doctor, MD  Blood Pressure: 116/72 mmHg  Height: 173 cm  Weight: 82 kg  BSA: 2 m2  Heart Rate: 85 mmHg  ------------------------------------------------------------------------  PROCEDURE: Transthoracic echocardiogram with 2-D, M-Mode  and complete spectral and color flow Doppler.  INDICATION: Chronic ischemic heart disease, unspecified  (I25.9)  ------------------------------------------------------------------------  DIMENSIONS:  Dimensions:     Normal Values:  LA:     3.3 cm    2.0 - 4.0 cm  Ao:     3.3 cm    2.0 - 3.8 cm  SEPTUM: 1.1 cm    0.6 - 1.2 cm  PWT:    1.0 cm    0.6 - 1.1 cm  LVIDd:  4.7 cm    3.0 - 5.6 cm  LVIDs:  3.1 cm    1.8 - 4.0 cm  Derived Variables:  LVMI: 90 g/m2  RWT: 0.42  Fractional short: 34 %  Ejection Fraction (Visual Estimate): 55 %  Peak Velocity (m/sec): AoV=1.4    < end of copied text >  < from: Transthoracic Echocardiogram (07.10.19 @ 17:55) >  CONCLUSIONS:  1. Normal mitral valve. Minimal mitral regurgitation.  2. Normal trileaflet aortic valve. No aortic valve  regurgitation seen.  3. Overall preserved left ventricular systolic function.  The basal inferior wall, the basal anterolateral wall, the  mid inferior wall, and the mid inferoseptum are mildly  hypokinetic.  4. Mild diastolic dysfunction (Stage I).  5. Normal right ventricular size and function.  6. Normal pericardium with no pericardial effusion.  *** No previous Echo exam.  ------------------------------------------------------------------------  Confirmed on  7/10/2019 - 19:02:58 by Regis Alvarez M.D.    < end of copied text >    LUIS: N/A	  RADIOLOGY: N/A    OTHER: N/A    LABS:	 	                          12.3   7.50  )-----------( 191      ( 06 Jan 2020 04:30 )             38.1     01-06    142  |  107  |  13  ----------------------------<  101<H>  3.9   |  24  |  0.94    Ca    8.8      06 Jan 2020 04:30    TPro  7.0  /  Alb  4.1  /  TBili  1.1  /  DBili  x   /  AST  26  /  ALT  35  /  AlkPhos  72  01-05    proBNP: N/A

## 2020-01-07 ENCOUNTER — TRANSCRIPTION ENCOUNTER (OUTPATIENT)
Age: 55
End: 2020-01-07

## 2020-01-07 VITALS
SYSTOLIC BLOOD PRESSURE: 113 MMHG | RESPIRATION RATE: 16 BRPM | HEART RATE: 70 BPM | DIASTOLIC BLOOD PRESSURE: 79 MMHG | TEMPERATURE: 98 F | OXYGEN SATURATION: 98 %

## 2020-01-07 RX ORDER — METOPROLOL TARTRATE 50 MG
1 TABLET ORAL
Qty: 0 | Refills: 0 | DISCHARGE

## 2020-01-07 RX ORDER — CLOPIDOGREL BISULFATE 75 MG/1
1 TABLET, FILM COATED ORAL
Qty: 0 | Refills: 0 | DISCHARGE

## 2020-01-07 RX ORDER — ASPIRIN/CALCIUM CARB/MAGNESIUM 324 MG
1 TABLET ORAL
Qty: 30 | Refills: 0
Start: 2020-01-07 | End: 2020-02-05

## 2020-01-07 RX ORDER — CANDESARTAN CILEXETIL 8 MG/1
1 TABLET ORAL
Qty: 0 | Refills: 0 | DISCHARGE

## 2020-01-07 RX ORDER — ATORVASTATIN CALCIUM 80 MG/1
1 TABLET, FILM COATED ORAL
Qty: 30 | Refills: 0
Start: 2020-01-07 | End: 2020-02-05

## 2020-01-07 RX ORDER — METOPROLOL TARTRATE 50 MG
1 TABLET ORAL
Qty: 30 | Refills: 0
Start: 2020-01-07 | End: 2020-02-05

## 2020-01-07 RX ORDER — ASPIRIN/CALCIUM CARB/MAGNESIUM 324 MG
1 TABLET ORAL
Qty: 0 | Refills: 0 | DISCHARGE

## 2020-01-07 RX ORDER — ATORVASTATIN CALCIUM 80 MG/1
1 TABLET, FILM COATED ORAL
Qty: 0 | Refills: 0 | DISCHARGE

## 2020-01-07 RX ORDER — CLOPIDOGREL BISULFATE 75 MG/1
1 TABLET, FILM COATED ORAL
Qty: 30 | Refills: 0
Start: 2020-01-07 | End: 2020-02-05

## 2020-01-07 RX ADMIN — Medication 81 MILLIGRAM(S): at 12:19

## 2020-01-07 RX ADMIN — SODIUM CHLORIDE 50 MILLILITER(S): 9 INJECTION INTRAMUSCULAR; INTRAVENOUS; SUBCUTANEOUS at 12:20

## 2020-01-07 RX ADMIN — Medication 50 MILLIGRAM(S): at 12:18

## 2020-01-07 RX ADMIN — CLOPIDOGREL BISULFATE 75 MILLIGRAM(S): 75 TABLET, FILM COATED ORAL at 12:19

## 2020-01-07 NOTE — PROGRESS NOTE ADULT - SUBJECTIVE AND OBJECTIVE BOX
[facility]    Subjective: Patient seen and examined. No new events except as noted.   doing well    REVIEW OF SYSTEMS:    CONSTITUTIONAL: No weakness, fevers or chills  EYES/ENT: No visual changes;  No vertigo or throat pain   NECK: No pain or stiffness  RESPIRATORY: No cough, wheezing, hemoptysis; No shortness of breath  CARDIOVASCULAR: No chest pain or palpitations  GASTROINTESTINAL: No abdominal or epigastric pain. No nausea, vomiting, or hematemesis; No diarrhea or constipation. No melena or hematochezia.  GENITOURINARY: No dysuria, frequency or hematuria  NEUROLOGICAL: No numbness or weakness  SKIN: No itching, burning, rashes, or lesions   All other review of systems is negative unless indicated above.    MEDICATIONS:  MEDICATIONS  (STANDING):  aspirin enteric coated 81 milliGRAM(s) Oral daily  atorvastatin 80 milliGRAM(s) Oral at bedtime  clopidogrel Tablet 75 milliGRAM(s) Oral daily  enoxaparin Injectable 40 milliGRAM(s) SubCutaneous every 24 hours  losartan 25 milliGRAM(s) Oral daily  metoprolol succinate ER 50 milliGRAM(s) Oral daily  sodium chloride 0.9%. 1000 milliLiter(s) (50 mL/Hr) IV Continuous <Continuous>      PHYSICAL EXAM:  T(C): 36.4 (20 @ 12:11), Max: 36.6 (20 @ 06:00)  HR: 70 (20 @ 12:11) (65 - 70)  BP: 113/79 (20 @ 12:11) (106/81 - 113/79)  RR: 16 (20 @ 12:11) (16 - 18)  SpO2: 98% (20 @ 12:11) (98% - 100%)  Wt(kg): --  I&O's Summary    2020 07:  -  2020 22:32  --------------------------------------------------------  IN: 730 mL / OUT: 800 mL / NET: -70 mL          Appearance: Normal	  HEENT:   Normal oral mucosa, PERRL, EOMI	  Lymphatic: No lymphadenopathy , no edema  Cardiovascular: Normal S1 S2, No JVD, No murmurs , Peripheral pulses palpable 2+ bilaterally  Respiratory: Lungs clear to auscultation, normal effort 	  Gastrointestinal:  Soft, Non-tender, + BS	  Skin: No rashes, No ecchymoses, No cyanosis, warm to touch  Musculoskeletal: Normal range of motion, normal strength  Psychiatry:  Mood & affect appropriate  Ext: No edema      All labs, Imaging and EKGs personally reviewed                           12.3   7.50  )-----------( 191      ( 2020 04:30 )             38.1               01-06    142  |  107  |  13  ----------------------------<  101<H>  3.9   |  24  |  0.94    Ca    8.8      2020 04:30  Phos  2.9     01-06  Mg     1.9     01-06                         Urinalysis Basic - ( 2020 11:40 )    Color: COLORLESS / Appearance: CLEAR / S.008 / pH: 7.0  Gluc: NEGATIVE / Ketone: NEGATIVE  / Bili: NEGATIVE / Urobili: NORMAL   Blood: NEGATIVE / Protein: NEGATIVE / Nitrite: NEGATIVE   Leuk Esterase: NEGATIVE / RBC: x / WBC x   Sq Epi: x / Non Sq Epi: x / Bacteria: x

## 2020-01-07 NOTE — PROGRESS NOTE ADULT - ATTENDING COMMENTS
Advanced care planning was discussed with patient and family.  Advanced care planning forms were reviewed and discussed.      D/C paning per card team     Virginia Coleman,    Internal Medicine  [address] [place] [address] [id number]  Office: [phone]  Pager: [phone]

## 2020-01-07 NOTE — PROGRESS NOTE ADULT - PROBLEM SELECTOR PLAN 1
tele monitor   cardiac enzymes x 2: neg delta  Orthostatic blood pressure: NOw negative after 1.5L of IV NS by EMS.  encourage PO fluids intake.  c/w ASA, Plavix, Statin.  -TTE

## 2020-01-07 NOTE — DISCHARGE NOTE PROVIDER - CARE PROVIDER_API CALL
Dr. Ruiz (PCP at [facility]),   Phone: (   )    -  Fax: (   )    -  Follow Up Time:

## 2020-01-07 NOTE — DISCHARGE NOTE NURSING/CASE MANAGEMENT/SOCIAL WORK - PATIENT PORTAL LINK FT
You can access the FollowMyHealth Patient Portal offered by [facility] by registering at the following website: [email or web address]. By joining [facility]’s FollowMyHealth portal, you will also be able to view your health information using other applications (apps) compatible with our system.

## 2020-01-07 NOTE — DISCHARGE NOTE PROVIDER - HOSPITAL COURSE
53 yo M PMhx HTN, MI, Cardiac Stents x4 (7/2019) s/p passed out at the gym admitted to tele for syncope and collapse. Cardiac enzymes negative. Found to be hypotensive in which  EMS resolved after IVF. Orthostatics negative. EP consulted- Likely neurocardiogenic syncope on exertion likely in the setting of neg chronotropic effects of Toprol XL and hypotension. Patient maintained on Toprol in the hospital without events. TTE with preserved EF, no WMA or valvular abnormalities.         Case discussed with Dr. Montano on 1/7/2020. The patient is medically stable for discharge and has appropriate follow up. 53 yo M PMhx HTN, MI, Cardiac Stents x4 (7/2019) s/p passed out at the gym admitted to tele for syncope and collapse. Cardiac enzymes negative. Found to be hypotensive in which  EMS resolved after IVF. Orthostatics negative. EP consulted- Likely neurocardiogenic syncope on exertion likely in the setting of neg chronotropic effects of Toprol XL and hypotension. Syncope occurred  at the gym on the day of admission and therefore may have been secondary to exertion. Patient maintained on Toprol in the hospital without events. TTE with preserved EF, no WMA or valvular abnormalities. Tele unremarkable.         Case discussed and discharge medications with Dr. Montano on 1/7/2020. The patient is medically stable for discharge and has appropriate follow up. 55 yo M PMhx HTN, MI, Cardiac Stents x4 (7/2019) s/p passed out at the gym admitted to tele for syncope and collapse. Cardiac enzymes negative. Found to be hypotensive in which  EMS resolved after IVF. Orthostatics negative. EP consulted- Likely neurocardiogenic syncope on exertion likely in the setting of neg chronotropic effects of Toprol XL and hypotension. Syncope occurred  at the gym on the day of admission and therefore may have been secondary to exertion. Patient maintained on Toprol in the hospital without events. Home ARB discontinued on discharge, TTE with preserved EF, no WMA or valvular abnormalities. Tele unremarkable.         Case discussed and discharge medications with Dr. Montano on 1/7/2020. The patient is medically stable for discharge and has appropriate follow up. 53 yo M PMhx HTN, MI, Cardiac Stents x4 (7/2019) s/p passed out at the gym admitted to tele for syncope and collapse. Cardiac enzymes negative. Found to be hypotensive in which  EMS resolved after IVF. Orthostatics negative. EP consulted- Likely neurocardiogenic syncope on exertion likely in the setting of neg chronotropic effects of Toprol XL and hypotension. Syncope occurred  at the gym on the day of admission and therefore may have been secondary to exertion. Patient maintained on Toprol in the hospital without events. Home ARB discontinued on discharge, TTE with preserved EF, no WMA or valvular abnormalities. Tele unremarkable.         Case discussed and discharge medications with Dr. Montano on 1/7/2020. The patient is medically stable for discharge and has appropriate follow up.         >35 minutes spent on discharge planning

## 2020-01-07 NOTE — CHART NOTE - NSCHARTNOTEFT_GEN_A_CORE
Telemetry reviewed and shows sinus rhythm with HR 60s-80s when awake; 40s to 50s during sleeping hours. Patient denies any chest pain, SOB, palpitations or dizziness.  - Continue metoprolol ER 50mg daily Colon cancer  metastatic to abdominal lymph nodes, uterus, and possibly lungs  Urinary obstruction  by abdominal mass s/p b/l nephrostomy tubes.

## 2020-01-07 NOTE — DISCHARGE NOTE PROVIDER - NSDCMRMEDTOKEN_GEN_ALL_CORE_FT
aspirin 81 mg oral tablet: 1 tab(s) orally once a day  atorvastatin 80 mg oral tablet: 1 tab(s) orally once a day  candesartan 4 mg oral tablet: 1 tab(s) orally once a day  clopidogrel 75 mg oral tablet: 1 tab(s) orally once a day  Toprol-XL 50 mg oral tablet, extended release: 1 tab(s) orally once a day aspirin 81 mg oral tablet: 1 tab(s) orally once a day  atorvastatin 80 mg oral tablet: 1 tab(s) orally once a day  clopidogrel 75 mg oral tablet: 1 tab(s) orally once a day  Toprol-XL 50 mg oral tablet, extended release: 1 tab(s) orally once a day

## 2021-06-22 DIAGNOSIS — I25.2 OLD MYOCARDIAL INFARCTION: ICD-10-CM

## 2021-06-29 ENCOUNTER — APPOINTMENT (OUTPATIENT)
Dept: CARDIOLOGY | Facility: CLINIC | Age: 56
End: 2021-06-29
Payer: MEDICAID

## 2021-06-29 VITALS
DIASTOLIC BLOOD PRESSURE: 75 MMHG | BODY MASS INDEX: 24.55 KG/M2 | OXYGEN SATURATION: 99 % | SYSTOLIC BLOOD PRESSURE: 116 MMHG | WEIGHT: 162 LBS | HEART RATE: 80 BPM | HEIGHT: 68 IN

## 2021-06-29 DIAGNOSIS — R55 SYNCOPE AND COLLAPSE: ICD-10-CM

## 2021-06-29 PROCEDURE — 93000 ELECTROCARDIOGRAM COMPLETE: CPT

## 2021-06-29 PROCEDURE — 99204 OFFICE O/P NEW MOD 45 MIN: CPT

## 2021-06-29 RX ORDER — NEBIVOLOL HYDROCHLORIDE 2.5 MG/1
2.5 TABLET ORAL EVERY OTHER DAY
Refills: 0 | Status: DISCONTINUED | COMMUNITY
End: 2021-06-29

## 2021-06-29 RX ORDER — METOPROLOL SUCCINATE 50 MG/1
50 TABLET, EXTENDED RELEASE ORAL
Qty: 30 | Refills: 0 | Status: DISCONTINUED | COMMUNITY
End: 2021-06-29

## 2021-06-29 RX ORDER — CLOPIDOGREL BISULFATE 75 MG/1
75 TABLET, FILM COATED ORAL DAILY
Qty: 1 | Refills: 1 | Status: DISCONTINUED | COMMUNITY
End: 2021-06-29

## 2021-06-30 PROBLEM — R55 SYNCOPE: Status: ACTIVE | Noted: 2021-06-22

## 2021-06-30 NOTE — HISTORY OF PRESENT ILLNESS
[FreeTextEntry1] : Jamshid is a 56-year-old gentleman hypertension, hyperlipidemia, CAD with stent 2019 who presents for cardiac evaluation. 2LAD, 1 RCA, 1 Diag. Now off plavix. Admitted to [facility] last year with syncope. He was changed from metoprolol to bystolic every other day and atorvastatin qod

## 2021-06-30 NOTE — REVIEW OF SYSTEMS
[SOB] : no shortness of breath [Dyspnea on exertion] : not dyspnea during exertion [Chest Discomfort] : no chest discomfort [Lower Ext Edema] : no extremity edema [Leg Claudication] : no intermittent leg claudication [Syncope] : no syncope [Negative] : Heme/Lymph

## 2021-06-30 NOTE — DISCUSSION/SUMMARY
[FreeTextEntry1] : The patient is a 56-year-old gentleman hypertension, hyperlipidemia CAD with prior episode of syncope.\par #1 CV- 2 LAD, 1cx, 1RCA, obtain cath reports, continue aspirin\par #2 Htn- change bystolic qod to toprol 25mg qd\par #3 LIpids- change atorvastatin 40mg qod to 20mg qd\par #4 General- Encouraged to start regular daily exercise

## 2022-01-04 ENCOUNTER — NON-APPOINTMENT (OUTPATIENT)
Age: 57
End: 2022-01-04

## 2022-01-04 ENCOUNTER — APPOINTMENT (OUTPATIENT)
Dept: CARDIOLOGY | Facility: CLINIC | Age: 57
End: 2022-01-04
Payer: MEDICAID

## 2022-01-04 VITALS
SYSTOLIC BLOOD PRESSURE: 139 MMHG | BODY MASS INDEX: 24.86 KG/M2 | DIASTOLIC BLOOD PRESSURE: 77 MMHG | WEIGHT: 164 LBS | HEIGHT: 68 IN | HEART RATE: 66 BPM | OXYGEN SATURATION: 99 %

## 2022-01-04 VITALS — DIASTOLIC BLOOD PRESSURE: 74 MMHG | SYSTOLIC BLOOD PRESSURE: 128 MMHG

## 2022-01-04 PROCEDURE — 93000 ELECTROCARDIOGRAM COMPLETE: CPT

## 2022-01-04 PROCEDURE — 99213 OFFICE O/P EST LOW 20 MIN: CPT

## 2022-01-04 NOTE — DISCUSSION/SUMMARY
[___ Year(s)] : in [unfilled] year(s) [FreeTextEntry1] : The patient is a 56-year-old gentleman hypertension, hyperlipidemia CAD with musculoskeletal pain that has resolved\par #1 CV- 2 LAD, 1cx, 1RCA, continue aspirin\par #2 Htn- continue toprol  12.5mg qd\par #3 LIpids- continue atorvastatin  20mg qd, labs therapeutic\par #4 General- reassured pain muscular, continue regular daily exercise

## 2022-03-01 ENCOUNTER — TRANSCRIPTION ENCOUNTER (OUTPATIENT)
Age: 57
End: 2022-03-01

## 2022-03-03 ENCOUNTER — APPOINTMENT (OUTPATIENT)
Dept: ORTHOPEDIC SURGERY | Facility: CLINIC | Age: 57
End: 2022-03-03

## 2022-04-12 NOTE — ED PROVIDER NOTE - CROS ED ROS STATEMENT
From: Avinash Sears  To: Grecia Bolton  Sent: 4/12/2022 10:36 AM CDT  Subject: Labetalol 300 milligrams     With the higher dosage do I take two full pills or 1 and a half. Also should I keep taking the 200 milligrams tablets until I run out or start on the higher dosage right away?   
all other ROS negative except as per HPI

## 2022-08-01 NOTE — PROGRESS NOTE ADULT - PROBLEM SELECTOR PROBLEM 2
Addended by: Marina Crow on: 8/1/2022 09:05 AM     Modules accepted: Orders Hypovolemia due to dehydration

## 2023-01-05 ENCOUNTER — APPOINTMENT (OUTPATIENT)
Dept: CARDIOLOGY | Facility: CLINIC | Age: 58
End: 2023-01-05
Payer: MEDICAID

## 2023-01-05 ENCOUNTER — NON-APPOINTMENT (OUTPATIENT)
Age: 58
End: 2023-01-05

## 2023-01-05 VITALS
HEART RATE: 61 BPM | OXYGEN SATURATION: 99 % | SYSTOLIC BLOOD PRESSURE: 122 MMHG | DIASTOLIC BLOOD PRESSURE: 74 MMHG | WEIGHT: 168 LBS | HEIGHT: 68 IN | BODY MASS INDEX: 25.46 KG/M2

## 2023-01-05 DIAGNOSIS — R07.89 OTHER CHEST PAIN: ICD-10-CM

## 2023-01-05 PROCEDURE — 99214 OFFICE O/P EST MOD 30 MIN: CPT | Mod: 25

## 2023-01-05 PROCEDURE — 93000 ELECTROCARDIOGRAM COMPLETE: CPT

## 2023-01-05 NOTE — HISTORY OF PRESENT ILLNESS
[FreeTextEntry1] : Jamshid has been walking less for exercise. Asking for NTG. No CP, palpitations but SOB on exertion relieved by rest.

## 2023-01-05 NOTE — DISCUSSION/SUMMARY
[FreeTextEntry1] : The patient is a 57-year-old gentleman hypertension, hyperlipidemia CAD with VARGAS\par #1 CV- 2 LAD, 1cx, 1RCA, continue aspirin, ECHO and nuclear stress ordered, no NTG for now\par #2 Htn- continue toprol  12.5mg qd\par #3 LIpids- continue atorvastatin  20mg qd, labs today\par #4 General- We discussed adherence to a Mediterranean diet and at least 30 minutes of daily exercise.\par  [EKG obtained to assist in diagnosis and management of assessed problem(s)] : EKG obtained to assist in diagnosis and management of assessed problem(s)

## 2023-01-06 LAB
25(OH)D3 SERPL-MCNC: 18.1 NG/ML
ALBUMIN SERPL ELPH-MCNC: 4.5 G/DL
ALP BLD-CCNC: 78 U/L
ALT SERPL-CCNC: 26 U/L
ANION GAP SERPL CALC-SCNC: 12 MMOL/L
AST SERPL-CCNC: 25 U/L
BASOPHILS # BLD AUTO: 0.09 K/UL
BASOPHILS NFR BLD AUTO: 1.1 %
BILIRUB SERPL-MCNC: 1.4 MG/DL
BUN SERPL-MCNC: 12 MG/DL
CALCIUM SERPL-MCNC: 9.6 MG/DL
CHLORIDE SERPL-SCNC: 104 MMOL/L
CHOLEST SERPL-MCNC: 106 MG/DL
CO2 SERPL-SCNC: 25 MMOL/L
CREAT SERPL-MCNC: 0.8 MG/DL
EGFR: 105 ML/MIN/1.73M2
EOSINOPHIL # BLD AUTO: 0.27 K/UL
EOSINOPHIL NFR BLD AUTO: 3.3 %
ESTIMATED AVERAGE GLUCOSE: 143 MG/DL
GLUCOSE SERPL-MCNC: 115 MG/DL
HBA1C MFR BLD HPLC: 6.6 %
HCT VFR BLD CALC: 42.5 %
HDLC SERPL-MCNC: 35 MG/DL
HGB BLD-MCNC: 13.8 G/DL
IMM GRANULOCYTES NFR BLD AUTO: 0.2 %
LDLC SERPL CALC-MCNC: 41 MG/DL
LYMPHOCYTES # BLD AUTO: 2.81 K/UL
LYMPHOCYTES NFR BLD AUTO: 34.5 %
MAN DIFF?: NORMAL
MCHC RBC-ENTMCNC: 27.8 PG
MCHC RBC-ENTMCNC: 32.5 GM/DL
MCV RBC AUTO: 85.7 FL
MONOCYTES # BLD AUTO: 0.71 K/UL
MONOCYTES NFR BLD AUTO: 8.7 %
NEUTROPHILS # BLD AUTO: 4.24 K/UL
NEUTROPHILS NFR BLD AUTO: 52.2 %
NONHDLC SERPL-MCNC: 71 MG/DL
PLATELET # BLD AUTO: 245 K/UL
POTASSIUM SERPL-SCNC: 4.2 MMOL/L
PROT SERPL-MCNC: 7.6 G/DL
RBC # BLD: 4.96 M/UL
RBC # FLD: 12.8 %
SODIUM SERPL-SCNC: 141 MMOL/L
TRIGL SERPL-MCNC: 148 MG/DL
WBC # FLD AUTO: 8.14 K/UL

## 2023-01-06 RX ORDER — MULTIVIT-MIN/IRON/FOLIC ACID/K 18-600-40
50 MCG CAPSULE ORAL
Refills: 0 | Status: ACTIVE | COMMUNITY

## 2023-01-19 ENCOUNTER — OFFICE (OUTPATIENT)
Dept: URBAN - METROPOLITAN AREA CLINIC 109 | Facility: CLINIC | Age: 58
Setting detail: OPHTHALMOLOGY
End: 2023-01-19
Payer: COMMERCIAL

## 2023-01-19 DIAGNOSIS — H40.013: ICD-10-CM

## 2023-01-19 DIAGNOSIS — H11.153: ICD-10-CM

## 2023-01-19 PROCEDURE — 99213 OFFICE O/P EST LOW 20 MIN: CPT | Performed by: OPHTHALMOLOGY

## 2023-01-19 PROCEDURE — 92083 EXTENDED VISUAL FIELD XM: CPT | Performed by: OPHTHALMOLOGY

## 2023-01-19 ASSESSMENT — REFRACTION_AUTOREFRACTION
OD_CYLINDER: -0.75
OD_AXIS: 29
OS_SPHERE: +0.25
OS_AXIS: 125
OS_CYLINDER: -0.25
OD_SPHERE: +0.25

## 2023-01-19 ASSESSMENT — KERATOMETRY
OS_K2POWER_DIOPTERS: 43.50
OS_K1POWER_DIOPTERS: 42.50
OD_K1POWER_DIOPTERS: 42.50
OD_K2POWER_DIOPTERS: 43.20
OD_AXISANGLE_DEGREES: 011
OS_AXISANGLE_DEGREES: 165

## 2023-01-19 ASSESSMENT — REFRACTION_CURRENTRX
OS_CYLINDER: -0.25
OS_OVR_VA: 20/
OD_ADD: +1.75
OD_VPRISM_DIRECTION: PROGS
OD_SPHERE: +0.50
OD_AXIS: 17
OD_OVR_VA: 20/
OS_ADD: +1.75
OS_AXIS: 127
OS_SPHERE: +0.50
OS_VPRISM_DIRECTION: PROGS
OD_CYLINDER: -1.00

## 2023-01-19 ASSESSMENT — REFRACTION_MANIFEST
OD_VA1: 20/20
OD_AXIS: 25
OS_VA1: 20/20
OS_SPHERE: PLANO
OD_SPHERE: PLANO
OD_CYLINDER: -1.00

## 2023-01-19 ASSESSMENT — SPHEQUIV_DERIVED
OS_SPHEQUIV: 0.125
OD_SPHEQUIV: -0.125

## 2023-01-19 ASSESSMENT — CONFRONTATIONAL VISUAL FIELD TEST (CVF)
OS_FINDINGS: FULL
OD_FINDINGS: FULL

## 2023-01-19 ASSESSMENT — AXIALLENGTH_DERIVED
OD_AL: 23.88
OS_AL: 23.7276

## 2023-01-19 ASSESSMENT — VISUAL ACUITY
OS_BCVA: 20/20-1
OD_BCVA: 20/20

## 2023-01-19 ASSESSMENT — PACHYMETRY
OS_CT_CORRECTION: 1
OD_CT_UM: 533
OS_CT_UM: 537
OD_CT_CORRECTION: 1

## 2023-01-23 ENCOUNTER — RX RENEWAL (OUTPATIENT)
Age: 58
End: 2023-01-23

## 2023-03-15 NOTE — ED ADULT TRIAGE NOTE - SPO2 (%)
100 Tetracycline Pregnancy And Lactation Text: This medication is Pregnancy Category D and not consider safe during pregnancy. It is also excreted in breast milk.

## 2023-03-23 ENCOUNTER — OUTPATIENT (OUTPATIENT)
Dept: OUTPATIENT SERVICES | Facility: HOSPITAL | Age: 58
LOS: 1 days | End: 2023-03-23
Payer: MEDICAID

## 2023-03-23 ENCOUNTER — APPOINTMENT (OUTPATIENT)
Dept: CV DIAGNOSITCS | Facility: HOSPITAL | Age: 58
End: 2023-03-23

## 2023-03-23 ENCOUNTER — APPOINTMENT (OUTPATIENT)
Dept: CV DIAGNOSTICS | Facility: HOSPITAL | Age: 58
End: 2023-03-23

## 2023-03-23 DIAGNOSIS — Z95.5 PRESENCE OF CORONARY ANGIOPLASTY IMPLANT AND GRAFT: Chronic | ICD-10-CM

## 2023-03-23 DIAGNOSIS — I25.10 ATHEROSCLEROTIC HEART DISEASE OF NATIVE CORONARY ARTERY WITHOUT ANGINA PECTORIS: ICD-10-CM

## 2023-03-23 PROCEDURE — 93356 MYOCRD STRAIN IMG SPCKL TRCK: CPT

## 2023-03-23 PROCEDURE — 93018 CV STRESS TEST I&R ONLY: CPT

## 2023-03-23 PROCEDURE — 93306 TTE W/DOPPLER COMPLETE: CPT | Mod: 26

## 2023-03-23 PROCEDURE — 78452 HT MUSCLE IMAGE SPECT MULT: CPT | Mod: 26,MH

## 2023-03-23 PROCEDURE — 93017 CV STRESS TEST TRACING ONLY: CPT

## 2023-03-23 PROCEDURE — 78452 HT MUSCLE IMAGE SPECT MULT: CPT | Mod: MH

## 2023-03-23 PROCEDURE — 93306 TTE W/DOPPLER COMPLETE: CPT

## 2023-03-23 PROCEDURE — A9500: CPT

## 2023-03-23 PROCEDURE — 93016 CV STRESS TEST SUPVJ ONLY: CPT

## 2023-04-20 ENCOUNTER — OFFICE (OUTPATIENT)
Dept: URBAN - METROPOLITAN AREA CLINIC 109 | Facility: CLINIC | Age: 58
Setting detail: OPHTHALMOLOGY
End: 2023-04-20
Payer: COMMERCIAL

## 2023-04-20 DIAGNOSIS — H11.153: ICD-10-CM

## 2023-04-20 DIAGNOSIS — H40.013: ICD-10-CM

## 2023-04-20 PROBLEM — H10.45 ALLERGIC CONJUNCTIVITIS: Status: ACTIVE | Noted: 2023-04-20

## 2023-04-20 PROCEDURE — 92133 CPTRZD OPH DX IMG PST SGM ON: CPT | Performed by: OPHTHALMOLOGY

## 2023-04-20 PROCEDURE — 99213 OFFICE O/P EST LOW 20 MIN: CPT | Performed by: OPHTHALMOLOGY

## 2023-04-20 ASSESSMENT — SPHEQUIV_DERIVED
OS_SPHEQUIV: 0.125
OD_SPHEQUIV: -0.125

## 2023-04-20 ASSESSMENT — REFRACTION_CURRENTRX
OD_OVR_VA: 20/
OD_SPHERE: +0.50
OS_SPHERE: +0.25
OD_VPRISM_DIRECTION: PROGS
OD_CYLINDER: -1.00
OS_CYLINDER: -0.25
OS_OVR_VA: 20/
OS_ADD: +1.75
OS_AXIS: 23
OD_AXIS: 34
OS_VPRISM_DIRECTION: PROGS
OD_ADD: +1.75

## 2023-04-20 ASSESSMENT — REFRACTION_MANIFEST
OD_AXIS: 25
OD_CYLINDER: -1.00
OD_SPHERE: PLANO
OS_SPHERE: PLANO
OS_VA1: 20/20
OD_VA1: 20/20

## 2023-04-20 ASSESSMENT — KERATOMETRY
OS_K1POWER_DIOPTERS: 42.50
OS_AXISANGLE_DEGREES: 165
OS_K2POWER_DIOPTERS: 43.50
OD_AXISANGLE_DEGREES: 011
OD_K2POWER_DIOPTERS: 43.20
OD_K1POWER_DIOPTERS: 42.50

## 2023-04-20 ASSESSMENT — REFRACTION_AUTOREFRACTION
OS_SPHERE: +0.25
OD_AXIS: 29
OD_CYLINDER: -0.75
OS_AXIS: 125
OS_CYLINDER: -0.25
OD_SPHERE: +0.25

## 2023-04-20 ASSESSMENT — PACHYMETRY
OD_CT_CORRECTION: 1
OD_CT_UM: 533
OS_CT_UM: 537
OS_CT_CORRECTION: 1

## 2023-04-20 ASSESSMENT — CONFRONTATIONAL VISUAL FIELD TEST (CVF)
OD_FINDINGS: FULL
OS_FINDINGS: FULL

## 2023-04-20 ASSESSMENT — AXIALLENGTH_DERIVED
OD_AL: 23.88
OS_AL: 23.7276

## 2023-04-20 ASSESSMENT — VISUAL ACUITY
OD_BCVA: 20/20-1
OS_BCVA: 20/20-1

## 2023-07-20 ENCOUNTER — OFFICE (OUTPATIENT)
Dept: URBAN - METROPOLITAN AREA CLINIC 109 | Facility: CLINIC | Age: 58
Setting detail: OPHTHALMOLOGY
End: 2023-07-20
Payer: COMMERCIAL

## 2023-07-20 DIAGNOSIS — H10.45: ICD-10-CM

## 2023-07-20 DIAGNOSIS — H40.013: ICD-10-CM

## 2023-07-20 PROBLEM — H16.223 DRY EYE SYNDROME K SICCA; BOTH EYES: Status: ACTIVE | Noted: 2023-07-20

## 2023-07-20 PROCEDURE — 92020 GONIOSCOPY: CPT | Performed by: OPHTHALMOLOGY

## 2023-07-20 PROCEDURE — 92250 FUNDUS PHOTOGRAPHY W/I&R: CPT | Performed by: OPHTHALMOLOGY

## 2023-07-20 PROCEDURE — 92014 COMPRE OPH EXAM EST PT 1/>: CPT | Performed by: OPHTHALMOLOGY

## 2023-07-20 PROCEDURE — 92083 EXTENDED VISUAL FIELD XM: CPT | Performed by: OPHTHALMOLOGY

## 2023-07-20 ASSESSMENT — REFRACTION_MANIFEST
OD_AXIS: 25
OS_VA1: 20/20
OD_SPHERE: PLANO
OD_VA1: 20/20
OS_SPHERE: PLANO
OD_CYLINDER: -1.00

## 2023-07-20 ASSESSMENT — REFRACTION_CURRENTRX
OD_SPHERE: +0.50
OD_VPRISM_DIRECTION: PROGS
OS_VPRISM_DIRECTION: PROGS
OS_OVR_VA: 20/
OS_AXIS: 23
OD_OVR_VA: 20/
OD_CYLINDER: -1.00
OD_ADD: +1.75
OS_CYLINDER: -0.25
OD_AXIS: 34
OS_ADD: +1.75
OS_SPHERE: +0.25

## 2023-07-20 ASSESSMENT — KERATOMETRY
OS_K1POWER_DIOPTERS: 42.50
OS_AXISANGLE_DEGREES: 165
OD_K1POWER_DIOPTERS: 42.50
OD_AXISANGLE_DEGREES: 011
OD_K2POWER_DIOPTERS: 43.20
OS_K2POWER_DIOPTERS: 43.50

## 2023-07-20 ASSESSMENT — SPHEQUIV_DERIVED: OD_SPHEQUIV: 0

## 2023-07-20 ASSESSMENT — CONFRONTATIONAL VISUAL FIELD TEST (CVF)
OD_FINDINGS: FULL
OS_FINDINGS: FULL

## 2023-07-20 ASSESSMENT — REFRACTION_AUTOREFRACTION
OD_AXIS: 27
OD_SPHERE: +0.25
OS_SPHERE: 0.00
OD_CYLINDER: -0.50

## 2023-07-20 ASSESSMENT — AXIALLENGTH_DERIVED: OD_AL: 23.83

## 2023-07-20 ASSESSMENT — PACHYMETRY
OD_CT_CORRECTION: 1
OD_CT_UM: 533
OS_CT_CORRECTION: 1
OS_CT_UM: 537

## 2023-07-20 ASSESSMENT — VISUAL ACUITY
OS_BCVA: 20/20
OD_BCVA: 20/20

## 2023-11-30 ENCOUNTER — OFFICE (OUTPATIENT)
Dept: URBAN - METROPOLITAN AREA CLINIC 109 | Facility: CLINIC | Age: 58
Setting detail: OPHTHALMOLOGY
End: 2023-11-30
Payer: COMMERCIAL

## 2023-11-30 DIAGNOSIS — H40.013: ICD-10-CM

## 2023-11-30 PROCEDURE — 99213 OFFICE O/P EST LOW 20 MIN: CPT | Performed by: OPHTHALMOLOGY

## 2023-11-30 ASSESSMENT — REFRACTION_CURRENTRX
OS_VPRISM_DIRECTION: PROGS
OD_SPHERE: +0.50
OS_SPHERE: +0.25
OD_AXIS: 34
OD_CYLINDER: -1.00
OD_ADD: +1.75
OS_AXIS: 23
OS_ADD: +1.75
OS_CYLINDER: -0.25
OS_OVR_VA: 20/
OD_VPRISM_DIRECTION: PROGS
OD_OVR_VA: 20/

## 2023-11-30 ASSESSMENT — REFRACTION_AUTOREFRACTION
OD_CYLINDER: -0.75
OS_SPHERE: +0.50
OS_AXIS: 99
OD_AXIS: 35
OD_SPHERE: +0.75
OS_CYLINDER: -0.25

## 2023-11-30 ASSESSMENT — SPHEQUIV_DERIVED
OS_SPHEQUIV: 0.375
OD_SPHEQUIV: 0.375

## 2023-11-30 ASSESSMENT — REFRACTION_MANIFEST
OD_SPHERE: PLANO
OS_VA1: 20/20
OD_CYLINDER: -1.00
OD_AXIS: 25
OS_SPHERE: PLANO
OD_VA1: 20/20

## 2023-11-30 ASSESSMENT — CONFRONTATIONAL VISUAL FIELD TEST (CVF)
OS_FINDINGS: FULL
OD_FINDINGS: FULL

## 2024-01-19 ENCOUNTER — RX RENEWAL (OUTPATIENT)
Age: 59
End: 2024-01-19

## 2024-01-23 ENCOUNTER — APPOINTMENT (OUTPATIENT)
Dept: CARDIOLOGY | Facility: CLINIC | Age: 59
End: 2024-01-23
Payer: MEDICAID

## 2024-01-23 ENCOUNTER — NON-APPOINTMENT (OUTPATIENT)
Age: 59
End: 2024-01-23

## 2024-01-23 VITALS
DIASTOLIC BLOOD PRESSURE: 64 MMHG | OXYGEN SATURATION: 98 % | BODY MASS INDEX: 25.46 KG/M2 | HEIGHT: 68 IN | WEIGHT: 168 LBS | SYSTOLIC BLOOD PRESSURE: 100 MMHG | HEART RATE: 73 BPM

## 2024-01-23 PROCEDURE — 99213 OFFICE O/P EST LOW 20 MIN: CPT | Mod: 25

## 2024-01-23 PROCEDURE — 93000 ELECTROCARDIOGRAM COMPLETE: CPT

## 2024-01-23 RX ORDER — ASPIRIN ENTERIC COATED TABLETS 81 MG 81 MG/1
81 TABLET, DELAYED RELEASE ORAL
Qty: 90 | Refills: 3 | Status: DISCONTINUED | COMMUNITY
Start: 1900-01-01 | End: 2024-01-23

## 2024-01-23 RX ORDER — ASPIRIN 81 MG/1
81 TABLET, COATED ORAL
Qty: 90 | Refills: 3 | Status: ACTIVE | COMMUNITY
Start: 2023-01-23 | End: 1900-01-01

## 2024-01-23 NOTE — DISCUSSION/SUMMARY
[FreeTextEntry1] : The patient is a 58-year-old gentleman HTN, HLD, CAD with vagal episode. #1 CV- 2 LAD, 1cx, 1RCA, c/w aspirin, ECHO and nuclear stress negative #2 HTN- c/w toprol  12.5mg qd #3 HLD- c/w atorvastatin 20mg qd, labs today #4 General- We discussed adherence to a Mediterranean diet and at least 30 minutes of daily exercise. Needs to see GI for colonoscopy. Needs more vegetables and water in his diet.  [EKG obtained to assist in diagnosis and management of assessed problem(s)] : EKG obtained to assist in diagnosis and management of assessed problem(s)

## 2024-01-23 NOTE — HISTORY OF PRESENT ILLNESS
[FreeTextEntry1] : Jamshid was having lower abdominal cramping and constipation then short episode of syncope. After BM felt fine. Walks for exercise but not so much in cold weather. No other symptoms but worried.

## 2024-01-24 LAB
25(OH)D3 SERPL-MCNC: 57.6 NG/ML
ALBUMIN SERPL ELPH-MCNC: 4.9 G/DL
ALP BLD-CCNC: 83 U/L
ALT SERPL-CCNC: 26 U/L
ANION GAP SERPL CALC-SCNC: 12 MMOL/L
AST SERPL-CCNC: 23 U/L
BILIRUB SERPL-MCNC: 1.2 MG/DL
BUN SERPL-MCNC: 12 MG/DL
CALCIUM SERPL-MCNC: 9.4 MG/DL
CHLORIDE SERPL-SCNC: 103 MMOL/L
CHOLEST SERPL-MCNC: 96 MG/DL
CO2 SERPL-SCNC: 24 MMOL/L
CREAT SERPL-MCNC: 0.81 MG/DL
EGFR: 103 ML/MIN/1.73M2
ESTIMATED AVERAGE GLUCOSE: 148 MG/DL
GLUCOSE SERPL-MCNC: 172 MG/DL
HBA1C MFR BLD HPLC: 6.8 %
HCT VFR BLD CALC: 42.4 %
HDLC SERPL-MCNC: 35 MG/DL
HGB BLD-MCNC: 13.5 G/DL
LDLC SERPL CALC-MCNC: 47 MG/DL
MCHC RBC-ENTMCNC: 27.4 PG
MCHC RBC-ENTMCNC: 31.8 GM/DL
MCV RBC AUTO: 86 FL
NONHDLC SERPL-MCNC: 61 MG/DL
PLATELET # BLD AUTO: 228 K/UL
POTASSIUM SERPL-SCNC: 4.4 MMOL/L
PROT SERPL-MCNC: 7.4 G/DL
PSA FREE FLD-MCNC: 36 %
PSA FREE SERPL-MCNC: 0.74 NG/ML
PSA SERPL-MCNC: 2.03 NG/ML
RBC # BLD: 4.93 M/UL
RBC # FLD: 12.7 %
SODIUM SERPL-SCNC: 140 MMOL/L
TRIGL SERPL-MCNC: 69 MG/DL
WBC # FLD AUTO: 6.34 K/UL

## 2024-04-04 ENCOUNTER — OFFICE (OUTPATIENT)
Dept: URBAN - METROPOLITAN AREA CLINIC 109 | Facility: CLINIC | Age: 59
Setting detail: OPHTHALMOLOGY
End: 2024-04-04
Payer: COMMERCIAL

## 2024-04-04 DIAGNOSIS — H40.013: ICD-10-CM

## 2024-04-04 DIAGNOSIS — H52.7: ICD-10-CM

## 2024-04-04 PROBLEM — H02.832 DERMATOCHALASIS; RIGHT LOWER LID, LEFT UPPER LID, LEFT LOWER LID, RIGHT UPPER LID: Status: ACTIVE | Noted: 2024-04-04

## 2024-04-04 PROBLEM — H02.831 DERMATOCHALASIS; RIGHT LOWER LID, LEFT UPPER LID, LEFT LOWER LID, RIGHT UPPER LID: Status: ACTIVE | Noted: 2024-04-04

## 2024-04-04 PROBLEM — H02.834 DERMATOCHALASIS; RIGHT LOWER LID, LEFT UPPER LID, LEFT LOWER LID, RIGHT UPPER LID: Status: ACTIVE | Noted: 2024-04-04

## 2024-04-04 PROBLEM — H02.835 DERMATOCHALASIS; RIGHT LOWER LID, LEFT UPPER LID, LEFT LOWER LID, RIGHT UPPER LID: Status: ACTIVE | Noted: 2024-04-04

## 2024-04-04 PROCEDURE — 92015 DETERMINE REFRACTIVE STATE: CPT | Performed by: OPHTHALMOLOGY

## 2024-04-04 PROCEDURE — 92133 CPTRZD OPH DX IMG PST SGM ON: CPT | Performed by: OPHTHALMOLOGY

## 2024-04-04 PROCEDURE — 99213 OFFICE O/P EST LOW 20 MIN: CPT | Performed by: OPHTHALMOLOGY

## 2024-04-04 ASSESSMENT — LID POSITION - DERMATOCHALASIS
OS_DERMATOCHALASIS: LLL LUL T
OD_DERMATOCHALASIS: RLL RUL T

## 2024-06-04 ENCOUNTER — NON-APPOINTMENT (OUTPATIENT)
Age: 59
End: 2024-06-04

## 2024-06-04 ENCOUNTER — APPOINTMENT (OUTPATIENT)
Dept: CARDIOLOGY | Facility: CLINIC | Age: 59
End: 2024-06-04
Payer: MEDICAID

## 2024-06-04 VITALS — DIASTOLIC BLOOD PRESSURE: 78 MMHG | WEIGHT: 157 LBS | BODY MASS INDEX: 23.87 KG/M2 | SYSTOLIC BLOOD PRESSURE: 120 MMHG

## 2024-06-04 VITALS
HEART RATE: 59 BPM | SYSTOLIC BLOOD PRESSURE: 118 MMHG | HEIGHT: 68 IN | OXYGEN SATURATION: 99 % | DIASTOLIC BLOOD PRESSURE: 74 MMHG

## 2024-06-04 DIAGNOSIS — E11.9 TYPE 2 DIABETES MELLITUS W/OUT COMPLICATIONS: ICD-10-CM

## 2024-06-04 DIAGNOSIS — I25.10 ATHEROSCLEROTIC HEART DISEASE OF NATIVE CORONARY ARTERY W/OUT ANGINA PECTORIS: ICD-10-CM

## 2024-06-04 DIAGNOSIS — I10 ESSENTIAL (PRIMARY) HYPERTENSION: ICD-10-CM

## 2024-06-04 PROCEDURE — 99214 OFFICE O/P EST MOD 30 MIN: CPT | Mod: 25

## 2024-06-04 PROCEDURE — 93000 ELECTROCARDIOGRAM COMPLETE: CPT

## 2024-06-04 PROCEDURE — G2211 COMPLEX E/M VISIT ADD ON: CPT | Mod: NC

## 2024-06-04 RX ORDER — ATORVASTATIN CALCIUM 20 MG/1
20 TABLET, FILM COATED ORAL
Qty: 90 | Refills: 3 | Status: ACTIVE | COMMUNITY
Start: 1900-01-01 | End: 1900-01-01

## 2024-06-04 NOTE — DISCUSSION/SUMMARY
[FreeTextEntry1] : The patient is a 59-year-old gentleman HTN, HLD, CAD who has lost weight but glucose and trig elevated. #1 CV- 2 LAD, 1cx, 1RCA, c/w aspirin, ECHO and nuclear stress negative #2 HTN- c/w toprol  12.5mg qd #3 HLD- c/w atorvastatin 20mg qd, labs today #4 General- We discussed adherence to a Mediterranean diet and at least 30 minutes of daily exercise.   [EKG obtained to assist in diagnosis and management of assessed problem(s)] : EKG obtained to assist in diagnosis and management of assessed problem(s)

## 2024-06-04 NOTE — REVIEW OF SYSTEMS
[Negative] : Heme/Lymph [Weight Loss (___ Lbs)] : [unfilled] ~Ulb weight loss [SOB] : no shortness of breath [Dyspnea on exertion] : not dyspnea during exertion [Chest Discomfort] : no chest discomfort [Lower Ext Edema] : no extremity edema [Leg Claudication] : no intermittent leg claudication [Syncope] : no syncope

## 2024-06-04 NOTE — HISTORY OF PRESENT ILLNESS
[FreeTextEntry1] : Jamshid watching his diet because of glucose and lost weight. Walks alot and no sx. BP elevated at PCP and K was high. Now labs with increase glucose and trig.

## 2024-06-05 LAB
CHOLEST SERPL-MCNC: 87 MG/DL
ESTIMATED AVERAGE GLUCOSE: 137 MG/DL
HBA1C MFR BLD HPLC: 6.4 %
HDLC SERPL-MCNC: 38 MG/DL
LDLC SERPL CALC-MCNC: 35 MG/DL
NONHDLC SERPL-MCNC: 48 MG/DL
TRIGL SERPL-MCNC: 54 MG/DL

## 2024-06-05 RX ORDER — METOPROLOL SUCCINATE 25 MG/1
25 TABLET, EXTENDED RELEASE ORAL
Qty: 90 | Refills: 2 | Status: ACTIVE | COMMUNITY
Start: 2021-06-29 | End: 1900-01-01

## 2025-01-14 ENCOUNTER — APPOINTMENT (OUTPATIENT)
Dept: CARDIOLOGY | Facility: CLINIC | Age: 60
End: 2025-01-14
Payer: MEDICAID

## 2025-01-14 ENCOUNTER — NON-APPOINTMENT (OUTPATIENT)
Age: 60
End: 2025-01-14

## 2025-01-14 VITALS
WEIGHT: 165 LBS | BODY MASS INDEX: 25.09 KG/M2 | DIASTOLIC BLOOD PRESSURE: 86 MMHG | SYSTOLIC BLOOD PRESSURE: 135 MMHG | OXYGEN SATURATION: 97 % | HEART RATE: 63 BPM

## 2025-01-14 DIAGNOSIS — E11.9 TYPE 2 DIABETES MELLITUS W/OUT COMPLICATIONS: ICD-10-CM

## 2025-01-14 PROCEDURE — 99214 OFFICE O/P EST MOD 30 MIN: CPT | Mod: 25

## 2025-01-14 PROCEDURE — 93000 ELECTROCARDIOGRAM COMPLETE: CPT

## 2025-01-15 LAB
25(OH)D3 SERPL-MCNC: 53 NG/ML
ALBUMIN SERPL ELPH-MCNC: 4.6 G/DL
ALP BLD-CCNC: 79 U/L
ALT SERPL-CCNC: 27 U/L
ANION GAP SERPL CALC-SCNC: 12 MMOL/L
AST SERPL-CCNC: 23 U/L
BILIRUB SERPL-MCNC: 1.5 MG/DL
BUN SERPL-MCNC: 12 MG/DL
CALCIUM SERPL-MCNC: 9.6 MG/DL
CHLORIDE SERPL-SCNC: 103 MMOL/L
CHOLEST SERPL-MCNC: 96 MG/DL
CO2 SERPL-SCNC: 26 MMOL/L
CREAT SERPL-MCNC: 0.86 MG/DL
EGFR: 101 ML/MIN/1.73M2
ESTIMATED AVERAGE GLUCOSE: 140 MG/DL
GLUCOSE SERPL-MCNC: 84 MG/DL
HBA1C MFR BLD HPLC: 6.5 %
HCT VFR BLD CALC: 41.5 %
HDLC SERPL-MCNC: 35 MG/DL
HGB BLD-MCNC: 14.1 G/DL
LDLC SERPL CALC-MCNC: 40 MG/DL
MCHC RBC-ENTMCNC: 28.1 PG
MCHC RBC-ENTMCNC: 34 G/DL
MCV RBC AUTO: 82.8 FL
NONHDLC SERPL-MCNC: 61 MG/DL
PLATELET # BLD AUTO: 231 K/UL
POTASSIUM SERPL-SCNC: 4.4 MMOL/L
PROT SERPL-MCNC: 7.6 G/DL
PSA FREE FLD-MCNC: 34 %
PSA FREE SERPL-MCNC: 0.47 NG/ML
PSA SERPL-MCNC: 1.41 NG/ML
RBC # BLD: 5.01 M/UL
RBC # FLD: 12.6 %
SODIUM SERPL-SCNC: 142 MMOL/L
TRIGL SERPL-MCNC: 116 MG/DL
WBC # FLD AUTO: 7.45 K/UL

## 2025-04-25 NOTE — PATIENT PROFILE ADULT - NSTOBACCONEVERSMOKERY/N_GEN_A
----- Message from Ayla Zamora MD sent at 4/25/2025  8:34 AM CDT -----  Fito Quintana! So your total IgE level is very LOW, which would not qualify you for Xolair but we still have other options. Additionally, your blood allergy testing is completely negative so as we'd discussed in clinic, in this case I would want to do the skin test in clinic. You have follow up in May so IF you feel you are able to hold your antihistamines for 5 days prior, do so and we can have both done on same visit, but if your breathing is not optimal, we can hold off on the skin testing for a bit longer, especially since primary goal is asthma control first and foremost.  Thanks!  Dr. Zamora  
No

## 2025-07-15 ENCOUNTER — APPOINTMENT (OUTPATIENT)
Dept: CARDIOLOGY | Facility: CLINIC | Age: 60
End: 2025-07-15
Payer: MEDICAID

## 2025-07-15 VITALS
OXYGEN SATURATION: 96 % | DIASTOLIC BLOOD PRESSURE: 81 MMHG | HEIGHT: 68 IN | BODY MASS INDEX: 25.16 KG/M2 | SYSTOLIC BLOOD PRESSURE: 122 MMHG | HEART RATE: 59 BPM | WEIGHT: 166 LBS

## 2025-07-15 PROCEDURE — 93000 ELECTROCARDIOGRAM COMPLETE: CPT

## 2025-07-15 PROCEDURE — 99214 OFFICE O/P EST MOD 30 MIN: CPT | Mod: 25

## 2025-07-16 LAB
ANION GAP SERPL CALC-SCNC: 14 MMOL/L
BUN SERPL-MCNC: 10 MG/DL
CALCIUM SERPL-MCNC: 9.1 MG/DL
CHLORIDE SERPL-SCNC: 105 MMOL/L
CO2 SERPL-SCNC: 23 MMOL/L
CREAT SERPL-MCNC: 0.85 MG/DL
EGFRCR SERPLBLD CKD-EPI 2021: 101 ML/MIN/1.73M2
ESTIMATED AVERAGE GLUCOSE: 146 MG/DL
GLUCOSE SERPL-MCNC: 128 MG/DL
HBA1C MFR BLD HPLC: 6.7 %
POTASSIUM SERPL-SCNC: 4.4 MMOL/L
SODIUM SERPL-SCNC: 142 MMOL/L